# Patient Record
Sex: MALE | Race: ASIAN | NOT HISPANIC OR LATINO | URBAN - METROPOLITAN AREA
[De-identification: names, ages, dates, MRNs, and addresses within clinical notes are randomized per-mention and may not be internally consistent; named-entity substitution may affect disease eponyms.]

---

## 2020-04-25 ENCOUNTER — EMERGENCY (EMERGENCY)
Facility: HOSPITAL | Age: 65
LOS: 1 days | Discharge: HOME | End: 2020-04-25
Attending: EMERGENCY MEDICINE
Payer: MEDICARE

## 2020-04-25 VITALS
HEART RATE: 83 BPM | WEIGHT: 195.99 LBS | SYSTOLIC BLOOD PRESSURE: 129 MMHG | RESPIRATION RATE: 16 BRPM | TEMPERATURE: 98 F | OXYGEN SATURATION: 99 % | DIASTOLIC BLOOD PRESSURE: 87 MMHG

## 2020-04-25 PROCEDURE — 94640 AIRWAY INHALATION TREATMENT: CPT

## 2020-04-25 PROCEDURE — 87635 SARS-COV-2 COVID-19 AMP PRB: CPT

## 2020-04-25 PROCEDURE — 99283 EMERGENCY DEPT VISIT LOW MDM: CPT | Mod: 25

## 2020-04-25 RX ORDER — ALBUTEROL 90 UG/1
1 AEROSOL, METERED ORAL ONCE
Refills: 0 | Status: COMPLETED | OUTPATIENT
Start: 2020-04-25 | End: 2020-04-25

## 2020-04-25 RX ADMIN — ALBUTEROL 1 PUFF(S): 90 AEROSOL, METERED ORAL at 23:11

## 2020-04-25 NOTE — ED PROVIDER NOTE - OBJECTIVE STATEMENT
63 y/o male comes in with cough x two weeks   mild sob    saturation on ambulation 97%   + sick contacts with covid

## 2020-04-25 NOTE — ED ADULT NURSE NOTE - NURSING NEURO ORIENTATION
Date: 9/28/2018    Patient Name: Aram Bruno          To Whom it may concern: This letter has been written at the patient's request. The above patient was seen at the [facility] for treatment of a medical condition.     This patient
oriented to person, place and time

## 2020-04-25 NOTE — ED PROVIDER NOTE - PATIENT PORTAL LINK FT
You can access the FollowMyHealth Patient Portal offered by [facility] by registering at the following website: [email or web address]. By joining [facility]’s FollowMyHealth portal, you will also be able to view your health information using other applications (apps) compatible with our system.

## 2020-04-25 NOTE — ED ADULT NURSE NOTE - CHIEF COMPLAINT QUOTE
He has a shortness of breath for weeks and he has been coughing as well , few weeks ago he had both legs numbness

## 2020-04-26 LAB — SARS-COV-2 RNA SPEC QL NAA+PROBE: SIGNIFICANT CHANGE UP

## 2020-07-06 ENCOUNTER — EMERGENCY (EMERGENCY)
Facility: HOSPITAL | Age: 65
LOS: 1 days | Discharge: HOME | End: 2020-07-06
Attending: EMERGENCY MEDICINE
Payer: MEDICARE

## 2020-07-06 VITALS
OXYGEN SATURATION: 98 % | HEART RATE: 80 BPM | TEMPERATURE: 98 F | DIASTOLIC BLOOD PRESSURE: 87 MMHG | RESPIRATION RATE: 16 BRPM | SYSTOLIC BLOOD PRESSURE: 136 MMHG

## 2020-07-06 VITALS
OXYGEN SATURATION: 98 % | HEIGHT: 66 IN | SYSTOLIC BLOOD PRESSURE: 146 MMHG | DIASTOLIC BLOOD PRESSURE: 97 MMHG | TEMPERATURE: 99 F | HEART RATE: 76 BPM | WEIGHT: 199.96 LBS | RESPIRATION RATE: 16 BRPM

## 2020-07-06 PROBLEM — Z78.9 OTHER SPECIFIED HEALTH STATUS: Chronic | Status: ACTIVE | Noted: 2020-04-28

## 2020-07-06 LAB
ALBUMIN SERPL ELPH-MCNC: 3.6 G/DL — SIGNIFICANT CHANGE UP (ref 3.5–5)
ALP SERPL-CCNC: 51 U/L — SIGNIFICANT CHANGE UP (ref 40–120)
ALT FLD-CCNC: 74 U/L DA — HIGH (ref 10–60)
ANION GAP SERPL CALC-SCNC: 8 MMOL/L — SIGNIFICANT CHANGE UP (ref 5–17)
APTT BLD: 33.8 SEC — SIGNIFICANT CHANGE UP (ref 27.5–35.5)
AST SERPL-CCNC: 34 U/L — SIGNIFICANT CHANGE UP (ref 10–40)
BASOPHILS # BLD AUTO: 0.02 K/UL — SIGNIFICANT CHANGE UP (ref 0–0.2)
BASOPHILS NFR BLD AUTO: 0.3 % — SIGNIFICANT CHANGE UP (ref 0–2)
BILIRUB SERPL-MCNC: 0.3 MG/DL — SIGNIFICANT CHANGE UP (ref 0.2–1.2)
BUN SERPL-MCNC: 24 MG/DL — HIGH (ref 7–18)
CALCIUM SERPL-MCNC: 8.9 MG/DL — SIGNIFICANT CHANGE UP (ref 8.4–10.5)
CHLORIDE SERPL-SCNC: 105 MMOL/L — SIGNIFICANT CHANGE UP (ref 96–108)
CO2 SERPL-SCNC: 30 MMOL/L — SIGNIFICANT CHANGE UP (ref 22–31)
CREAT SERPL-MCNC: 1.19 MG/DL — SIGNIFICANT CHANGE UP (ref 0.5–1.3)
EOSINOPHIL # BLD AUTO: 0.1 K/UL — SIGNIFICANT CHANGE UP (ref 0–0.5)
EOSINOPHIL NFR BLD AUTO: 1.6 % — SIGNIFICANT CHANGE UP (ref 0–6)
GLUCOSE SERPL-MCNC: 115 MG/DL — HIGH (ref 70–99)
HCT VFR BLD CALC: 43 % — SIGNIFICANT CHANGE UP (ref 39–50)
HGB BLD-MCNC: 13.9 G/DL — SIGNIFICANT CHANGE UP (ref 13–17)
IMM GRANULOCYTES NFR BLD AUTO: 0.2 % — SIGNIFICANT CHANGE UP (ref 0–1.5)
INR BLD: 0.98 RATIO — SIGNIFICANT CHANGE UP (ref 0.88–1.16)
LYMPHOCYTES # BLD AUTO: 2.09 K/UL — SIGNIFICANT CHANGE UP (ref 1–3.3)
LYMPHOCYTES # BLD AUTO: 33.7 % — SIGNIFICANT CHANGE UP (ref 13–44)
MCHC RBC-ENTMCNC: 28.8 PG — SIGNIFICANT CHANGE UP (ref 27–34)
MCHC RBC-ENTMCNC: 32.3 GM/DL — SIGNIFICANT CHANGE UP (ref 32–36)
MCV RBC AUTO: 89 FL — SIGNIFICANT CHANGE UP (ref 80–100)
MONOCYTES # BLD AUTO: 0.53 K/UL — SIGNIFICANT CHANGE UP (ref 0–0.9)
MONOCYTES NFR BLD AUTO: 8.5 % — SIGNIFICANT CHANGE UP (ref 2–14)
NEUTROPHILS # BLD AUTO: 3.45 K/UL — SIGNIFICANT CHANGE UP (ref 1.8–7.4)
NEUTROPHILS NFR BLD AUTO: 55.7 % — SIGNIFICANT CHANGE UP (ref 43–77)
NRBC # BLD: 0 /100 WBCS — SIGNIFICANT CHANGE UP (ref 0–0)
PLATELET # BLD AUTO: 217 K/UL — SIGNIFICANT CHANGE UP (ref 150–400)
POTASSIUM SERPL-MCNC: 4.1 MMOL/L — SIGNIFICANT CHANGE UP (ref 3.5–5.3)
POTASSIUM SERPL-SCNC: 4.1 MMOL/L — SIGNIFICANT CHANGE UP (ref 3.5–5.3)
PROT SERPL-MCNC: 7.9 G/DL — SIGNIFICANT CHANGE UP (ref 6–8.3)
PROTHROM AB SERPL-ACNC: 11.5 SEC — SIGNIFICANT CHANGE UP (ref 10.6–13.6)
RBC # BLD: 4.83 M/UL — SIGNIFICANT CHANGE UP (ref 4.2–5.8)
RBC # FLD: 13.2 % — SIGNIFICANT CHANGE UP (ref 10.3–14.5)
SODIUM SERPL-SCNC: 143 MMOL/L — SIGNIFICANT CHANGE UP (ref 135–145)
TROPONIN I SERPL-MCNC: <0.015 NG/ML — SIGNIFICANT CHANGE UP (ref 0–0.04)
WBC # BLD: 6.2 K/UL — SIGNIFICANT CHANGE UP (ref 3.8–10.5)
WBC # FLD AUTO: 6.2 K/UL — SIGNIFICANT CHANGE UP (ref 3.8–10.5)

## 2020-07-06 PROCEDURE — 99284 EMERGENCY DEPT VISIT MOD MDM: CPT

## 2020-07-06 PROCEDURE — 70450 CT HEAD/BRAIN W/O DYE: CPT | Mod: 26

## 2020-07-06 PROCEDURE — 36415 COLL VENOUS BLD VENIPUNCTURE: CPT

## 2020-07-06 PROCEDURE — 70450 CT HEAD/BRAIN W/O DYE: CPT

## 2020-07-06 PROCEDURE — 85027 COMPLETE CBC AUTOMATED: CPT

## 2020-07-06 PROCEDURE — 80053 COMPREHEN METABOLIC PANEL: CPT

## 2020-07-06 PROCEDURE — 82962 GLUCOSE BLOOD TEST: CPT

## 2020-07-06 PROCEDURE — 85730 THROMBOPLASTIN TIME PARTIAL: CPT

## 2020-07-06 PROCEDURE — 99284 EMERGENCY DEPT VISIT MOD MDM: CPT | Mod: 25

## 2020-07-06 PROCEDURE — 85610 PROTHROMBIN TIME: CPT

## 2020-07-06 PROCEDURE — 93005 ELECTROCARDIOGRAM TRACING: CPT

## 2020-07-06 PROCEDURE — 84484 ASSAY OF TROPONIN QUANT: CPT

## 2020-07-06 RX ORDER — VALACYCLOVIR 500 MG/1
1 TABLET, FILM COATED ORAL
Qty: 21 | Refills: 0
Start: 2020-07-06 | End: 2020-07-12

## 2020-07-06 RX ADMIN — Medication 50 MILLIGRAM(S): at 03:07

## 2020-07-06 NOTE — ED PROVIDER NOTE - PATIENT PORTAL LINK FT
You can access the FollowMyHealth Patient Portal offered by [facility] by registering at the following website: [email or web address]. By joining [facility]’s FollowMyHealth portal, you will also be able to view your health information using other applications (apps) compatible with our system.

## 2020-07-06 NOTE — ED PROVIDER NOTE - CLINICAL SUMMARY MEDICAL DECISION MAKING FREE TEXT BOX
CT head reported: No acute intracranial CT finding. Pt's symptoms cw with Howells palsy. Rx Valsartan, Prednisone, lacrilube. Pt is well appearing, has no new complaints and able to walk with normal gait. Pt is stable for discharge and follow up with medical doctor. Pt educated on care and need for follow up. Discussed anticipatory guidance and return precautions. Questions answered. I had a detailed discussion with the patient and/or guardian regarding the historical points, exam findings, and any diagnostic results supporting the discharge diagnosis.

## 2020-07-06 NOTE — ED ADULT NURSE NOTE - OBJECTIVE STATEMENT
The patient presents with right facial droop, onset at 1700.  His wife told him that his face looked funny.  Right facial droop noted and the patient verbalized a slight numbness to his right face.  No other neuro symptoms noted.

## 2020-07-06 NOTE — ED ADULT TRIAGE NOTE - CHIEF COMPLAINT QUOTE
as per son; " My mother noticed his one side of face dropping, it was about 5:30 " as per son; " My mother noticed his one side of face dropping, it was about 5:30 "  Positive covid 2 months ago and repeat test Negative covid after in May

## 2020-07-06 NOTE — ED PROVIDER NOTE - NSFOLLOWUPCLINICS_GEN_ALL_ED_FT
[facility]  Neurology  [address] [place] [address]  Phone: [phone]  Fax: [phone]  Follow Up Time:

## 2020-07-06 NOTE — ED PROVIDER NOTE - OBJECTIVE STATEMENT
64 year old male with no PMHx presenting after his wife noted right sided facial weakness since 5:30PM yesterday. Denies any other motor weakness, slurred speech, chest pain, shortness of breath, or any other acute complaints.

## 2020-07-06 NOTE — ED ADULT NURSE NOTE - CHPI ED NUR SYMPTOMS NEG
no confusion/no blurred vision/no nausea/no vomiting/no loss of consciousness/no dizziness/no change in level of consciousness/no weakness/no fever

## 2020-07-06 NOTE — ED ADULT NURSE NOTE - CHIEF COMPLAINT QUOTE
as per son; " My mother noticed his one side of face dropping, it was about 5:30 "  Positive covid 2 months ago and repeat test Negative covid after in May

## 2021-07-15 NOTE — ED ADULT NURSE NOTE - CAS ELECT INFOMATION PROVIDED
Body Location Override (Optional - Billing Will Still Be Based On Selected Body Map Location If Applicable): right upper arm
Detail Level: Detailed
Add 40530 Cpt? (Important Note: In 2017 The Use Of 09787 Is Being Tracked By Cms To Determine Future Global Period Reimbursement For Global Periods): yes
DC instructions

## 2022-04-02 ENCOUNTER — EMERGENCY (EMERGENCY)
Facility: HOSPITAL | Age: 67
LOS: 1 days | Discharge: HOME | End: 2022-04-02
Attending: STUDENT IN AN ORGANIZED HEALTH CARE EDUCATION/TRAINING PROGRAM
Payer: MEDICARE

## 2022-04-02 VITALS
TEMPERATURE: 97 F | OXYGEN SATURATION: 98 % | RESPIRATION RATE: 18 BRPM | DIASTOLIC BLOOD PRESSURE: 92 MMHG | HEART RATE: 90 BPM | HEIGHT: 66 IN | WEIGHT: 199.96 LBS | SYSTOLIC BLOOD PRESSURE: 148 MMHG

## 2022-04-02 PROCEDURE — 99283 EMERGENCY DEPT VISIT LOW MDM: CPT | Mod: 25

## 2022-04-02 PROCEDURE — 90715 TDAP VACCINE 7 YRS/> IM: CPT

## 2022-04-02 PROCEDURE — 99284 EMERGENCY DEPT VISIT MOD MDM: CPT

## 2022-04-02 PROCEDURE — 90471 IMMUNIZATION ADMIN: CPT

## 2022-04-02 RX ORDER — TETANUS TOXOID, REDUCED DIPHTHERIA TOXOID AND ACELLULAR PERTUSSIS VACCINE, ADSORBED 5; 2.5; 8; 8; 2.5 [IU]/.5ML; [IU]/.5ML; UG/.5ML; UG/.5ML; UG/.5ML
0.5 SUSPENSION INTRAMUSCULAR ONCE
Refills: 0 | Status: COMPLETED | OUTPATIENT
Start: 2022-04-02 | End: 2022-04-02

## 2022-04-02 RX ADMIN — Medication 1 TABLET(S): at 21:19

## 2022-04-02 RX ADMIN — TETANUS TOXOID, REDUCED DIPHTHERIA TOXOID AND ACELLULAR PERTUSSIS VACCINE, ADSORBED 0.5 MILLILITER(S): 5; 2.5; 8; 8; 2.5 SUSPENSION INTRAMUSCULAR at 21:19

## 2022-04-02 NOTE — ED ADULT NURSE NOTE - BRAND OF COVID-19 VACCINATION
Left voicemail for Meño to see availability for coumadin clinic tomorrow before noon for Paula. Will follow up regarding same.   
Moderna dose 1, 2, and 3

## 2022-04-02 NOTE — ED PROVIDER NOTE - NSFOLLOWUPINSTRUCTIONS_ED_ALL_ED_FT
You were seen in the emergency department for: dog bite  Your diagnosis for this visit was:  From this ED visit you were prescribed: Augmentin     You may be contacted by the Emergency Department Referrals Coordinator to set up your follow-up appointment within 24-48 hours of your discharge, Monday to Friday. We recommend you follow up with: your primary care doctor    Please return to the Emergency Department if you experience any of the following symptoms:   - Shortness of breath or trouble breathing  - Pressure, pain or tightness in the chest  - Face drooping, arm weakness or speech difficulty  - Persistence of severe vomiting  - Head injury or loss of consciousness  - Nonstop bleeding or an open wound    (1) Follow up with your primary care physician within the next 24-48 hours as discussed. In addition, we did not find evidence of a life threatening illness on your testing here today, but listed below are the specialists that will be necessary to see as an outpatient to continue the workup.  Please call the numbers listed below or [phone] to set up the necessary appointments.  (2) Take Tylenol (up to 1000mg or 1 g)  and/or Motrin (up to 600mg) up to every 6 hours as needed for pain.   (3) If you had an IV (intravenous) line placed, it was removed. Sometimes, after IV removal, that area can be tender for a few days; if it develops redness and swelling, those could be signs of infection; in which case, return to the Emergency Department for assessment.  (4) Please continue taking all of your home medications as directed. You were seen in the emergency department for: dog bite  From this ED visit you were prescribed: Augmentin   You also received a tetanus shot.  Please return if you develop severe pain, redness, drainage, fevers/chills, or any concerning symptoms.     You may be contacted by the Emergency Department Referrals Coordinator to set up your follow-up appointment within 24-48 hours of your discharge, Monday to Friday. We recommend you follow up with: your primary care doctor    Please return to the Emergency Department if you experience any of the following symptoms:   - Shortness of breath or trouble breathing  - Pressure, pain or tightness in the chest  - Face drooping, arm weakness or speech difficulty  - Persistence of severe vomiting  - Head injury or loss of consciousness  - Nonstop bleeding or an open wound    (1) Follow up with your primary care physician within the next 24-48 hours as discussed. In addition, we did not find evidence of a life threatening illness on your testing here today, but listed below are the specialists that will be necessary to see as an outpatient to continue the workup.  Please call the numbers listed below or [phone] to set up the necessary appointments.  (2) Take Tylenol (up to 1000mg or 1 g)  and/or Motrin (up to 600mg) up to every 6 hours as needed for pain.   (3) If you had an IV (intravenous) line placed, it was removed. Sometimes, after IV removal, that area can be tender for a few days; if it develops redness and swelling, those could be signs of infection; in which case, return to the Emergency Department for assessment.  (4) Please continue taking all of your home medications as directed.

## 2022-04-02 NOTE — ED PROVIDER NOTE - CLINICAL SUMMARY MEDICAL DECISION MAKING FREE TEXT BOX
66 year old male with no past medical history presents to ED following a dog bite to the right leg. No indication for wound closure. Will update tetanus and provide Augmentin for infection prophylaxis. No indication for rabies prophylaxis given low likelihood of rabies. Patient has contact info for owner of the dog and will follow up regarding vaccination status.

## 2022-04-02 NOTE — ED ADULT NURSE NOTE - CHIEF COMPLAINT QUOTE
Pt stated he was walking his dog almost 2 hours ago and his dog got into a fight he tried to pull his dog out and the other dog bite him on his right lower leg.

## 2022-04-02 NOTE — ED PROVIDER NOTE - OBJECTIVE STATEMENT
66 year old male with no past medical history presents to ED following a dog bite to the right leg. He reports that he was walking his dog when another off-leash dog attempted to his attack his own dog. Patient reportedly picked up his own dog and was bitten in the leg by the other dog. He is unsure whether that dog had its vaccines. He denies current symptoms, and states that he is just concerned and wants to get his leg checked out. He denies numbness, tingling, weakness, or other concerning symptoms. Last Tdap is unknown. NKDA.

## 2022-04-02 NOTE — ED ADULT NURSE NOTE - OBJECTIVE STATEMENT
AOX4 +ambulatory patient reports he was walking his dog when an off leash dog attacked his dog. Patient tried to  his dog but the off leash dog bit his leg. Patient sustained puncture wound no r leg. No fevers or chills

## 2022-04-29 NOTE — ED ADULT TRIAGE NOTE - CHIEF COMPLAINT QUOTE
Outpatient follow-up with PCP in regards to this matter Pt stated he was walking his dog almost 2 hours ago and his dog got into a fight he tried to pull his dog out and the other dog bite him on his right lower leg. Erivedge Counseling- I discussed with the patient the risks of Erivedge including but not limited to nausea, vomiting, diarrhea, constipation, weight loss, changes in the sense of taste, decreased appetite, muscle spasms, and hair loss.  The patient verbalized understanding of the proper use and possible adverse effects of Erivedge.  All of the patient's questions and concerns were addressed.

## 2022-05-03 NOTE — ED ADULT NURSE NOTE - SKIN CAPILLARY REFILL
[No studies available for review at this time.] : No studies available for review at this time. 2 seconds or less

## 2024-01-29 NOTE — ED ADULT NURSE NOTE - NSFALLRSKASSESSTYPE_ED_ALL_ED
Runny nose. \"Water on my lungs, Im spitting up blood\". Feels like fluids are going down into lungs when drinking.   Initial (On Arrival)

## 2025-04-07 ENCOUNTER — INPATIENT (INPATIENT)
Facility: HOSPITAL | Age: 70
LOS: 5 days | Discharge: HOME | DRG: 392 | End: 2025-04-13
Attending: STUDENT IN AN ORGANIZED HEALTH CARE EDUCATION/TRAINING PROGRAM | Admitting: STUDENT IN AN ORGANIZED HEALTH CARE EDUCATION/TRAINING PROGRAM
Payer: MEDICARE

## 2025-04-07 VITALS
SYSTOLIC BLOOD PRESSURE: 109 MMHG | OXYGEN SATURATION: 100 % | WEIGHT: 203.93 LBS | DIASTOLIC BLOOD PRESSURE: 79 MMHG | RESPIRATION RATE: 20 BRPM | TEMPERATURE: 96 F | HEART RATE: 111 BPM

## 2025-04-07 DIAGNOSIS — R19.5 OTHER FECAL ABNORMALITIES: ICD-10-CM

## 2025-04-07 LAB
ALBUMIN SERPL ELPH-MCNC: 3.5 G/DL — SIGNIFICANT CHANGE UP (ref 3.5–5)
ALP SERPL-CCNC: 33 U/L — LOW (ref 40–120)
ALT FLD-CCNC: 32 U/L DA — SIGNIFICANT CHANGE UP (ref 10–60)
ANION GAP SERPL CALC-SCNC: 5 MMOL/L — SIGNIFICANT CHANGE UP (ref 5–17)
ANION GAP SERPL CALC-SCNC: 7 MMOL/L — SIGNIFICANT CHANGE UP (ref 5–17)
AST SERPL-CCNC: 16 U/L — SIGNIFICANT CHANGE UP (ref 10–40)
BASOPHILS # BLD AUTO: 0.03 K/UL — SIGNIFICANT CHANGE UP (ref 0–0.2)
BASOPHILS NFR BLD AUTO: 0.2 % — SIGNIFICANT CHANGE UP (ref 0–2)
BILIRUB SERPL-MCNC: 0.5 MG/DL — SIGNIFICANT CHANGE UP (ref 0.2–1.2)
BLD GP AB SCN SERPL QL: SIGNIFICANT CHANGE UP
BUN SERPL-MCNC: 44 MG/DL — HIGH (ref 7–18)
BUN SERPL-MCNC: 45 MG/DL — HIGH (ref 7–18)
CALCIUM SERPL-MCNC: 8.4 MG/DL — SIGNIFICANT CHANGE UP (ref 8.4–10.5)
CALCIUM SERPL-MCNC: 9.2 MG/DL — SIGNIFICANT CHANGE UP (ref 8.4–10.5)
CHLORIDE SERPL-SCNC: 105 MMOL/L — SIGNIFICANT CHANGE UP (ref 96–108)
CHLORIDE SERPL-SCNC: 108 MMOL/L — SIGNIFICANT CHANGE UP (ref 96–108)
CO2 SERPL-SCNC: 24 MMOL/L — SIGNIFICANT CHANGE UP (ref 22–31)
CO2 SERPL-SCNC: 26 MMOL/L — SIGNIFICANT CHANGE UP (ref 22–31)
CREAT SERPL-MCNC: 0.88 MG/DL — SIGNIFICANT CHANGE UP (ref 0.5–1.3)
CREAT SERPL-MCNC: 1.04 MG/DL — SIGNIFICANT CHANGE UP (ref 0.5–1.3)
EGFR: 77 ML/MIN/1.73M2 — SIGNIFICANT CHANGE UP
EGFR: 77 ML/MIN/1.73M2 — SIGNIFICANT CHANGE UP
EGFR: 92 ML/MIN/1.73M2 — SIGNIFICANT CHANGE UP
EGFR: 92 ML/MIN/1.73M2 — SIGNIFICANT CHANGE UP
EOSINOPHIL # BLD AUTO: 0.02 K/UL — SIGNIFICANT CHANGE UP (ref 0–0.5)
EOSINOPHIL NFR BLD AUTO: 0.1 % — SIGNIFICANT CHANGE UP (ref 0–6)
GLUCOSE SERPL-MCNC: 182 MG/DL — HIGH (ref 70–99)
GLUCOSE SERPL-MCNC: 183 MG/DL — HIGH (ref 70–99)
HCT VFR BLD CALC: 26.6 % — LOW (ref 39–50)
HCT VFR BLD CALC: 31.4 % — LOW (ref 39–50)
HGB BLD-MCNC: 10.4 G/DL — LOW (ref 13–17)
HGB BLD-MCNC: 8.9 G/DL — LOW (ref 13–17)
IMM GRANULOCYTES NFR BLD AUTO: 0.5 % — SIGNIFICANT CHANGE UP (ref 0–0.9)
LACTATE SERPL-SCNC: 3.6 MMOL/L — HIGH (ref 0.7–2)
LIDOCAIN IGE QN: 47 U/L — SIGNIFICANT CHANGE UP (ref 13–75)
LYMPHOCYTES # BLD AUTO: 15.8 % — SIGNIFICANT CHANGE UP (ref 13–44)
LYMPHOCYTES # BLD AUTO: 2.22 K/UL — SIGNIFICANT CHANGE UP (ref 1–3.3)
MCHC RBC-ENTMCNC: 29.6 PG — SIGNIFICANT CHANGE UP (ref 27–34)
MCHC RBC-ENTMCNC: 29.7 PG — SIGNIFICANT CHANGE UP (ref 27–34)
MCHC RBC-ENTMCNC: 33.1 G/DL — SIGNIFICANT CHANGE UP (ref 32–36)
MCHC RBC-ENTMCNC: 33.5 G/DL — SIGNIFICANT CHANGE UP (ref 32–36)
MCV RBC AUTO: 88.4 FL — SIGNIFICANT CHANGE UP (ref 80–100)
MCV RBC AUTO: 89.7 FL — SIGNIFICANT CHANGE UP (ref 80–100)
MONOCYTES # BLD AUTO: 0.38 K/UL — SIGNIFICANT CHANGE UP (ref 0–0.9)
MONOCYTES NFR BLD AUTO: 2.7 % — SIGNIFICANT CHANGE UP (ref 2–14)
NEUTROPHILS # BLD AUTO: 11.36 K/UL — HIGH (ref 1.8–7.4)
NEUTROPHILS NFR BLD AUTO: 80.7 % — HIGH (ref 43–77)
NRBC BLD AUTO-RTO: 0 /100 WBCS — SIGNIFICANT CHANGE UP (ref 0–0)
NRBC BLD AUTO-RTO: 0 /100 WBCS — SIGNIFICANT CHANGE UP (ref 0–0)
PLATELET # BLD AUTO: 226 K/UL — SIGNIFICANT CHANGE UP (ref 150–400)
PLATELET # BLD AUTO: 270 K/UL — SIGNIFICANT CHANGE UP (ref 150–400)
POTASSIUM SERPL-MCNC: 4.3 MMOL/L — SIGNIFICANT CHANGE UP (ref 3.5–5.3)
POTASSIUM SERPL-MCNC: 4.5 MMOL/L — SIGNIFICANT CHANGE UP (ref 3.5–5.3)
POTASSIUM SERPL-SCNC: 4.3 MMOL/L — SIGNIFICANT CHANGE UP (ref 3.5–5.3)
POTASSIUM SERPL-SCNC: 4.5 MMOL/L — SIGNIFICANT CHANGE UP (ref 3.5–5.3)
PROT SERPL-MCNC: 6.8 G/DL — SIGNIFICANT CHANGE UP (ref 6–8.3)
RBC # BLD: 3.01 M/UL — LOW (ref 4.2–5.8)
RBC # BLD: 3.5 M/UL — LOW (ref 4.2–5.8)
RBC # FLD: 12.7 % — SIGNIFICANT CHANGE UP (ref 10.3–14.5)
RBC # FLD: 12.8 % — SIGNIFICANT CHANGE UP (ref 10.3–14.5)
SODIUM SERPL-SCNC: 137 MMOL/L — SIGNIFICANT CHANGE UP (ref 135–145)
SODIUM SERPL-SCNC: 138 MMOL/L — SIGNIFICANT CHANGE UP (ref 135–145)
TROPONIN I, HIGH SENSITIVITY RESULT: 52.9 NG/L — SIGNIFICANT CHANGE UP
WBC # BLD: 10.91 K/UL — HIGH (ref 3.8–10.5)
WBC # BLD: 14.08 K/UL — HIGH (ref 3.8–10.5)
WBC # FLD AUTO: 10.91 K/UL — HIGH (ref 3.8–10.5)
WBC # FLD AUTO: 14.08 K/UL — HIGH (ref 3.8–10.5)

## 2025-04-07 PROCEDURE — 74178 CT ABD&PLV WO CNTR FLWD CNTR: CPT | Mod: 26

## 2025-04-07 PROCEDURE — 99285 EMERGENCY DEPT VISIT HI MDM: CPT

## 2025-04-07 PROCEDURE — 99223 1ST HOSP IP/OBS HIGH 75: CPT

## 2025-04-07 RX ORDER — ONDANSETRON HCL/PF 4 MG/2 ML
4 VIAL (ML) INJECTION EVERY 8 HOURS
Refills: 0 | Status: DISCONTINUED | OUTPATIENT
Start: 2025-04-07 | End: 2025-04-13

## 2025-04-07 RX ORDER — ACETAMINOPHEN 500 MG/5ML
650 LIQUID (ML) ORAL EVERY 6 HOURS
Refills: 0 | Status: DISCONTINUED | OUTPATIENT
Start: 2025-04-07 | End: 2025-04-13

## 2025-04-07 RX ORDER — ACETAMINOPHEN 500 MG/5ML
1000 LIQUID (ML) ORAL ONCE
Refills: 0 | Status: COMPLETED | OUTPATIENT
Start: 2025-04-07 | End: 2025-04-07

## 2025-04-07 RX ORDER — ONDANSETRON HCL/PF 4 MG/2 ML
4 VIAL (ML) INJECTION ONCE
Refills: 0 | Status: COMPLETED | OUTPATIENT
Start: 2025-04-07 | End: 2025-04-07

## 2025-04-07 RX ORDER — MAGNESIUM, ALUMINUM HYDROXIDE 200-200 MG
30 TABLET,CHEWABLE ORAL ONCE
Refills: 0 | Status: COMPLETED | OUTPATIENT
Start: 2025-04-07 | End: 2025-04-07

## 2025-04-07 RX ADMIN — Medication 4 MILLIGRAM(S): at 17:03

## 2025-04-07 RX ADMIN — Medication 400 MILLIGRAM(S): at 20:21

## 2025-04-07 RX ADMIN — Medication 1000 MILLILITER(S): at 17:11

## 2025-04-07 RX ADMIN — Medication 1000 MILLILITER(S): at 20:21

## 2025-04-07 RX ADMIN — Medication 30 MILLILITER(S): at 20:21

## 2025-04-07 RX ADMIN — Medication 80 MILLIGRAM(S): at 17:08

## 2025-04-07 NOTE — ED ADULT NURSE NOTE - CHIEF COMPLAINT QUOTE
BIB son c/o fatigue and black stools x 3 days. Near syncopal episode in triage with episode of black vomitus. Pt urinated on himself x 2 today per son. No blood thinner use. MD Carter aware and present at bedside.

## 2025-04-07 NOTE — ED PROVIDER NOTE - PROGRESS NOTE DETAILS
Patient reassessed overall feels better.  Discussed need for admission further evaluate for GI bleed.  Answered questions.

## 2025-04-07 NOTE — ED ADULT NURSE NOTE - OBJECTIVE STATEMENT
BIB son c/o fatigue and black stools x 3 days. Near syncopal episode in triage with episode of black vomitus. Pt urinated on himself x 2 today per son. No blood thinner use. MD Carter aware and present at bedside. Patient is placed on cardiac monitor and Sinus tach at 110 Hr noted. Patient denies chest pain, no SOB.  Fall bundle activated. All fall measures in place. Patient and family educated on fall prevention and possible risks of injuries. Patient and family verbalized understanding, agreed to call for assistance if needed and agrees to stay in bed for safety.

## 2025-04-07 NOTE — H&P ADULT - NSHPPHYSICALEXAM_GEN_ALL_CORE
T(F): 98.7 (07 Apr 2025 20:22), Max: 98.7 (07 Apr 2025 20:22)  HR: 108 (07 Apr 2025 21:56)  BP: 103/64 (07 Apr 2025 21:56)  RR: 20 (07 Apr 2025 20:22)  SpO2: 97% (07 Apr 2025 20:22) (97% - 100%)  temp max in last 48H T(F): , Max: 98.7 (04-07-25 @ 20:22)        GENERAL: NAD, lying in bed comfortably   HEAD:  Atraumatic, Normocephalic  EYES: clear conjunctiva and  sclera   ENT: Moist mucous membranes  NECK: Supple  LUNG: Clear to auscultation bilaterally. No rales, wheezing,   HEART: Regular rate and rhythm; No murmurs,  CHEST WALL: non tenderness    ABDOMEN: + LUQ tenderness, Bowel sounds present; Soft, , Nondistended,   EXTREMITIES:  2+ Peripheral Pulses, . No clubbing, cyanosis, or edema  RECTAL: + black stool noted on his underwear  NERVOUS SYSTEM:  AAOX3, speech clear. No deficits   SKIN: No rashes or lesions

## 2025-04-07 NOTE — H&P ADULT - PROBLEM SELECTOR PLAN 1
p/w dark stool, dizziness, fatigue. denies NSAID use   v/s: 103/64, 120  PE: Dark stool noted   Hgb 10.4  on admission  CT abdomen & pelvis: No CT findings to suggest active GI bleeding. No bowel obstruction or inflammation.  Hgb 10.4 > 8.9   - Started IV Protonix 40mg BID   - Diet: Clear liquid   - CBC Q8hrs   - Transfuse if Hgb < 7  - Consult GI in the AM p/w dark stool, dizziness, fatigue. denies NSAID use   v/s: 103/64, 120  PE: Dark stool noted   Hgb 10.4  on admission  CT abdomen & pelvis: No CT findings to suggest active GI bleeding. No bowel obstruction or inflammation.  Hgb 10.4 > 8.9   s/p Zofran, tylenol, maloox, Pantoprazole 80mg   - Started IV Protonix 40mg BID   - Diet: Clear liquid   - CBC Q8hrs   - Transfuse if Hgb < 7  - Consult GI in the AM p/w dark stool, dizziness, fatigue. denies NSAID use   v/s: 103/64, 120  PE: Dark stool noted   Hgb 10.4  on admission  CT abdomen & pelvis: No CT findings to suggest active GI bleeding. No bowel obstruction or inflammation.  Hgb 10.4 > 8.9   s/p Zofran, tylenol, maloox, Pantoprazole 80mg   - Started IV Protonix 40mg BID   - Diet: NPO  - CBC Q4hrs   - Transfuse if Hgb < 7  - GI consulted. SHEA Jama

## 2025-04-07 NOTE — H&P ADULT - NSHPREVIEWOFSYSTEMS_GEN_ALL_CORE
REVIEW OF SYSTEMS:  CONSTITUTIONAL: No fevers or chills  EYES: No conjunctiva redness, No eye discharge  ENT: No nasal congestion, No sore throat, No ear pain,   NECK: No pain or stiffness  RESPIRATORY: No cough, SOB,  wheezing, hemoptysis  CARDIOVASCULAR: No chest pain or  palpitations  GASTROINTESTINAL: + abdominal pain. nausea, + black stool, No vomiting, diarrhea or constipation.  GENITOURINARY: No dysuria, frequency or hematuria  NEUROLOGICAL: + Dizziness, No headache,  SKIN: No itching, rashes,   All other review of systems is negative unless indicated above.

## 2025-04-07 NOTE — H&P ADULT - ATTENDING COMMENTS
Vital Signs Last 24 Hrs  T(C): 37.1 (07 Apr 2025 20:22), Max: 37.1 (07 Apr 2025 20:22)  T(F): 98.7 (07 Apr 2025 20:22), Max: 98.7 (07 Apr 2025 20:22)  HR: 108 (07 Apr 2025 21:56) (108 - 120)  BP: 103/64 (07 Apr 2025 21:56) (103/64 - 109/79)  RR: 20 (07 Apr 2025 20:22) (20 - 20)  SpO2: 97% (07 Apr 2025 20:22) (97% - 100%)  Parameters below as of 07 Apr 2025 20:22  Patient On (Oxygen Delivery Method): room air    Labs  wbc 14 k - 11k  hgb10.4 --> 8.9    BUN 45-->44  Cr 1.04 --> 0.88    Trop 168  Lactate 3.6      CT abdomen/pelvis  1.  No CT findings to suggest active GI bleeding. No bowel obstruction or   inflammation.  2.  Incidental 1.6 cm circumscribed enhancing mass in the pancreatic   head, suspicious for a neuroendocrine tumor. Additional indeterminate 8   mm enhancing focus in the left hepatic lobe; metastasis is not excluded.     Impression   69 year old man with hx of HTN with no prior visit to this facility who presents with weakness, dizziness preceded by  few days of dark stools, nausea and dark colored emesis as well but no abdominal pain.  Concern for upper GI bleed especially with persistent azotemia    Incidental finding of an enhancing pancreatic head mass concerning for a neuroendocrine tumor with another smaller enhancing left hepatic lobe focus  --> r/o functioning gastrin- producing PNET    Plan   Admit to Medicine   No evidence of active bleeding   Serial H/H  PPI 40mg q 12hourly  IVF hydration and repeat lactate  Serial troponin   Check serum gastrin  GI consult   EGD and EUS of pancreatic tumor  Oncology consult

## 2025-04-07 NOTE — H&P ADULT - HISTORY OF PRESENT ILLNESS
68 yo M, from home. Ex smoker ( Quit 10 years ago, 30pk years) with PMH of HLD, DM present with 3 days history of well formed  dark stool, generalized weakness, dizziness ( room spinning sensation) and nausea that worsened today. He  recently returned from a 5-week trip froom [place] where he reports eating variety of food. He endorses 10 years history of dull intermittent LUQ abdominal pain. He does not know what triggers the pain but it tends to resolve on its own. Reports he never disclosed the pain to any doctor in the  past because the pain resolves on its own.  He denies any fever, chest pain, sob, fever, cough or palpitations. vomiting, abdominal surgeries or required blood transfusion,   Never had any issues with gastritis or stomach ulcers., weight loss

## 2025-04-07 NOTE — ED ADULT NURSE NOTE - NSFALLHARMRISKINTERV_ED_ALL_ED
Assistance OOB with selected safe patient handling equipment if applicable/Communicate risk of Fall with Harm to all staff, patient, and family/Orthostatic vital signs/Provide visual cue: red socks, yellow wristband, yellow gown, etc/Reinforce activity limits and safety measures with patient and family/Bed in lowest position, wheels locked, appropriate side rails in place/Call bell, personal items and telephone in reach/Instruct patient to call for assistance before getting out of bed/chair/stretcher/Non-slip footwear applied when patient is off stretcher/[place] to call system/Physically safe environment - no spills, clutter or unnecessary equipment/Purposeful Proactive Rounding/Room/bathroom lighting operational, light cord in reach

## 2025-04-07 NOTE — H&P ADULT - ASSESSMENT
68 yo M, from home. Ex smoker ( Quit 10 years ago, 30pk years) with PMH of HLD, DM present with 3 days history of well formed  dark stool, generalized weakness, dizziness ( room spinning sensation) and nausea that worsened today. Admitted to medicine for dark  stool

## 2025-04-07 NOTE — ED PROVIDER NOTE - CLINICAL SUMMARY MEDICAL DECISION MAKING FREE TEXT BOX
69-year-old male with history of hypertension presenting with generalized weakness with history of recent dark tarry stools.  Patient with nausea and vomiting in triage dark-colored.  Concern for GI bleed, gastroenteritis, metabolic derangement less likely ACS among other causes.  Plan to perform labs, type and screen, EKG, chest x-ray IV hydration and reassess.

## 2025-04-07 NOTE — ED PROVIDER NOTE - OBJECTIVE STATEMENT
69-year-old male history of hypertension presenting with generalized weakness today.  Also relates lightheadedness/dizziness.  Relates 3 days of dark tarry stools.  Today started feeling nauseated.  Last had crackers and coffee in the morning.  Recently returned from a 5-week trip to [place] where he states he "ate a lot".  Accompanied by son Cedrick who relates that patient never had anemia.  Never had any abdominal surgeries or required blood transfusion before.  Never had any issues with gastritis or stomach ulcers. 69-year-old male history of hypertension presenting with generalized weakness today.  Also relates lightheadedness/dizziness.  Relates 3 days of dark but formed stools.  Today started feeling nauseated.  Last had crackers and coffee in the morning.  Recently returned from a 5-week trip to [place] where he states he "ate a lot".  Accompanied by son Cedrick who relates that patient never had anemia.  No cp, sob, fever, cough or palpitations. Not on blood thinners. Never had any abdominal surgeries or required blood transfusion before.  Never had any issues with gastritis or stomach ulcers.

## 2025-04-07 NOTE — ED ADULT TRIAGE NOTE - CHIEF COMPLAINT QUOTE
BIB son c/o fatigue and black stools x 3 days. Near syncopal episode in triage with episode of bloody vomitus. Pt urinated on himself x 2. No blood thinner use. MD Carter aware. BIB son c/o fatigue and black stools x 3 days. Near syncopal episode in triage with episode of black vomitus. Pt urinated on himself x 2 today per son. No blood thinner use. MD Carter aware and present at bedside.

## 2025-04-07 NOTE — H&P ADULT - PROBLEM SELECTOR PLAN 5
DVT ppx: SCD  GI ppx: PPI BID Hx of DM on Metformin 500mg QD  - Hold home med   - Start Insulin sliding scale   - Finger sticks  - Diabetic diet  - f/u A1c

## 2025-04-07 NOTE — H&P ADULT - PROBLEM SELECTOR PLAN 2
CT Abdomen & Pelvis: Incidental 1.6 cm circumscribed enhancing mass in the pancreatic head, suspicious for a neuroendocrine tumor. Additional indeterminate 8 mm enhancing focus in the left hepatic lobe; metastasis is not excluded.   - Consult GI in the AM  - f/u Gastrin lactate 3.5 on admission  s/p 2 L NS   - trend lactate   - c/w  IVF lactate 3.5 on admission  s/p 2 L NS   Lactate 3.5 > 2.1  - c/w  IVF

## 2025-04-07 NOTE — H&P ADULT - PROBLEM SELECTOR PLAN 4
Hx of DM on Metformin 500mg QD  - Hold home med   - Start Insulin sliding scale   - Finger sticks  - Diabetic diet  - f/u A1c Hx of HLD on Atorvastatin 20mg   - c/w home med

## 2025-04-08 DIAGNOSIS — E78.5 HYPERLIPIDEMIA, UNSPECIFIED: ICD-10-CM

## 2025-04-08 DIAGNOSIS — E11.9 TYPE 2 DIABETES MELLITUS WITHOUT COMPLICATIONS: ICD-10-CM

## 2025-04-08 DIAGNOSIS — Z29.9 ENCOUNTER FOR PROPHYLACTIC MEASURES, UNSPECIFIED: ICD-10-CM

## 2025-04-08 DIAGNOSIS — R79.89 OTHER SPECIFIED ABNORMAL FINDINGS OF BLOOD CHEMISTRY: ICD-10-CM

## 2025-04-08 DIAGNOSIS — D49.0 NEOPLASM OF UNSPECIFIED BEHAVIOR OF DIGESTIVE SYSTEM: ICD-10-CM

## 2025-04-08 DIAGNOSIS — R19.5 OTHER FECAL ABNORMALITIES: ICD-10-CM

## 2025-04-08 LAB
ABO RH CONFIRMATION: SIGNIFICANT CHANGE UP
ALBUMIN SERPL ELPH-MCNC: 3.1 G/DL — LOW (ref 3.5–5)
ALP SERPL-CCNC: 29 U/L — LOW (ref 40–120)
ALT FLD-CCNC: 28 U/L DA — SIGNIFICANT CHANGE UP (ref 10–60)
ANION GAP SERPL CALC-SCNC: 7 MMOL/L — SIGNIFICANT CHANGE UP (ref 5–17)
ANISOCYTOSIS BLD QL: SLIGHT — SIGNIFICANT CHANGE UP
APPEARANCE UR: CLEAR — SIGNIFICANT CHANGE UP
AST SERPL-CCNC: 10 U/L — SIGNIFICANT CHANGE UP (ref 10–40)
BASOPHILS # BLD AUTO: 0.02 K/UL — SIGNIFICANT CHANGE UP (ref 0–0.2)
BASOPHILS NFR BLD AUTO: 0.2 % — SIGNIFICANT CHANGE UP (ref 0–2)
BILIRUB SERPL-MCNC: 0.4 MG/DL — SIGNIFICANT CHANGE UP (ref 0.2–1.2)
BILIRUB UR-MCNC: NEGATIVE — SIGNIFICANT CHANGE UP
BUN SERPL-MCNC: 42 MG/DL — HIGH (ref 7–18)
CALCIUM SERPL-MCNC: 8.3 MG/DL — LOW (ref 8.4–10.5)
CHLORIDE SERPL-SCNC: 108 MMOL/L — SIGNIFICANT CHANGE UP (ref 96–108)
CO2 SERPL-SCNC: 23 MMOL/L — SIGNIFICANT CHANGE UP (ref 22–31)
COLOR SPEC: YELLOW — SIGNIFICANT CHANGE UP
CREAT SERPL-MCNC: 1.08 MG/DL — SIGNIFICANT CHANGE UP (ref 0.5–1.3)
DIFF PNL FLD: NEGATIVE — SIGNIFICANT CHANGE UP
EGFR: 73 ML/MIN/1.73M2 — SIGNIFICANT CHANGE UP
EGFR: 73 ML/MIN/1.73M2 — SIGNIFICANT CHANGE UP
EOSINOPHIL # BLD AUTO: 0 K/UL — SIGNIFICANT CHANGE UP (ref 0–0.5)
EOSINOPHIL NFR BLD AUTO: 0 % — SIGNIFICANT CHANGE UP (ref 0–6)
GLUCOSE BLDC GLUCOMTR-MCNC: 239 MG/DL — HIGH (ref 70–99)
GLUCOSE SERPL-MCNC: 224 MG/DL — HIGH (ref 70–99)
GLUCOSE UR QL: 250 MG/DL
HCT VFR BLD CALC: 19.8 % — CRITICAL LOW (ref 39–50)
HCT VFR BLD CALC: 24.1 % — LOW (ref 39–50)
HCT VFR BLD CALC: 24.7 % — LOW (ref 39–50)
HGB BLD-MCNC: 6.4 G/DL — CRITICAL LOW (ref 13–17)
HGB BLD-MCNC: 8 G/DL — LOW (ref 13–17)
HGB BLD-MCNC: 8 G/DL — LOW (ref 13–17)
IMM GRANULOCYTES NFR BLD AUTO: 0.5 % — SIGNIFICANT CHANGE UP (ref 0–0.9)
KETONES UR-MCNC: NEGATIVE MG/DL — SIGNIFICANT CHANGE UP
LACTATE SERPL-SCNC: 2.1 MMOL/L — HIGH (ref 0.7–2)
LACTATE SERPL-SCNC: 3.4 MMOL/L — HIGH (ref 0.7–2)
LEUKOCYTE ESTERASE UR-ACNC: NEGATIVE — SIGNIFICANT CHANGE UP
LG PLATELETS BLD QL AUTO: SLIGHT — SIGNIFICANT CHANGE UP
LYMPHOCYTES # BLD AUTO: 17.6 % — SIGNIFICANT CHANGE UP (ref 13–44)
LYMPHOCYTES # BLD AUTO: 2.05 K/UL — SIGNIFICANT CHANGE UP (ref 1–3.3)
MAGNESIUM SERPL-MCNC: 2.4 MG/DL — SIGNIFICANT CHANGE UP (ref 1.6–2.6)
MANUAL SMEAR VERIFICATION: SIGNIFICANT CHANGE UP
MCHC RBC-ENTMCNC: 29.3 PG — SIGNIFICANT CHANGE UP (ref 27–34)
MCHC RBC-ENTMCNC: 29.5 PG — SIGNIFICANT CHANGE UP (ref 27–34)
MCHC RBC-ENTMCNC: 30 PG — SIGNIFICANT CHANGE UP (ref 27–34)
MCHC RBC-ENTMCNC: 32.3 G/DL — SIGNIFICANT CHANGE UP (ref 32–36)
MCHC RBC-ENTMCNC: 32.4 G/DL — SIGNIFICANT CHANGE UP (ref 32–36)
MCHC RBC-ENTMCNC: 33.2 G/DL — SIGNIFICANT CHANGE UP (ref 32–36)
MCV RBC AUTO: 90.3 FL — SIGNIFICANT CHANGE UP (ref 80–100)
MCV RBC AUTO: 90.5 FL — SIGNIFICANT CHANGE UP (ref 80–100)
MCV RBC AUTO: 91.2 FL — SIGNIFICANT CHANGE UP (ref 80–100)
MICROCYTES BLD QL: SLIGHT — SIGNIFICANT CHANGE UP
MONOCYTES # BLD AUTO: 0.59 K/UL — SIGNIFICANT CHANGE UP (ref 0–0.9)
MONOCYTES NFR BLD AUTO: 5.1 % — SIGNIFICANT CHANGE UP (ref 2–14)
NEUTROPHILS # BLD AUTO: 8.93 K/UL — HIGH (ref 1.8–7.4)
NEUTROPHILS NFR BLD AUTO: 76.6 % — SIGNIFICANT CHANGE UP (ref 43–77)
NITRITE UR-MCNC: NEGATIVE — SIGNIFICANT CHANGE UP
NRBC BLD AUTO-RTO: 0 /100 WBCS — SIGNIFICANT CHANGE UP (ref 0–0)
PH UR: 5 — SIGNIFICANT CHANGE UP (ref 5–8)
PHOSPHATE SERPL-MCNC: 2.5 MG/DL — SIGNIFICANT CHANGE UP (ref 2.5–4.5)
PLAT MORPH BLD: NORMAL — SIGNIFICANT CHANGE UP
PLATELET # BLD AUTO: 186 K/UL — SIGNIFICANT CHANGE UP (ref 150–400)
PLATELET # BLD AUTO: 200 K/UL — SIGNIFICANT CHANGE UP (ref 150–400)
PLATELET # BLD AUTO: 226 K/UL — SIGNIFICANT CHANGE UP (ref 150–400)
POIKILOCYTOSIS BLD QL AUTO: SLIGHT — SIGNIFICANT CHANGE UP
POTASSIUM SERPL-MCNC: 4.3 MMOL/L — SIGNIFICANT CHANGE UP (ref 3.5–5.3)
POTASSIUM SERPL-SCNC: 4.3 MMOL/L — SIGNIFICANT CHANGE UP (ref 3.5–5.3)
PROT SERPL-MCNC: 6 G/DL — SIGNIFICANT CHANGE UP (ref 6–8.3)
PROT UR-MCNC: NEGATIVE MG/DL — SIGNIFICANT CHANGE UP
RBC # BLD: 2.17 M/UL — LOW (ref 4.2–5.8)
RBC # BLD: 2.67 M/UL — LOW (ref 4.2–5.8)
RBC # BLD: 2.73 M/UL — LOW (ref 4.2–5.8)
RBC # FLD: 13.1 % — SIGNIFICANT CHANGE UP (ref 10.3–14.5)
RBC # FLD: 13.2 % — SIGNIFICANT CHANGE UP (ref 10.3–14.5)
RBC # FLD: 13.4 % — SIGNIFICANT CHANGE UP (ref 10.3–14.5)
RBC BLD AUTO: ABNORMAL
SODIUM SERPL-SCNC: 138 MMOL/L — SIGNIFICANT CHANGE UP (ref 135–145)
SP GR SPEC: 1.02 — SIGNIFICANT CHANGE UP (ref 1–1.03)
SPHEROCYTES BLD QL SMEAR: SLIGHT — SIGNIFICANT CHANGE UP
UROBILINOGEN FLD QL: 0.2 MG/DL — SIGNIFICANT CHANGE UP (ref 0.2–1)
WBC # BLD: 10.21 K/UL — SIGNIFICANT CHANGE UP (ref 3.8–10.5)
WBC # BLD: 10.52 K/UL — HIGH (ref 3.8–10.5)
WBC # BLD: 11.65 K/UL — HIGH (ref 3.8–10.5)
WBC # FLD AUTO: 10.21 K/UL — SIGNIFICANT CHANGE UP (ref 3.8–10.5)
WBC # FLD AUTO: 10.52 K/UL — HIGH (ref 3.8–10.5)
WBC # FLD AUTO: 11.65 K/UL — HIGH (ref 3.8–10.5)

## 2025-04-08 PROCEDURE — 99223 1ST HOSP IP/OBS HIGH 75: CPT | Mod: FS

## 2025-04-08 PROCEDURE — 99233 SBSQ HOSP IP/OBS HIGH 50: CPT | Mod: FS

## 2025-04-08 RX ORDER — METFORMIN HYDROCHLORIDE 850 MG/1
1 TABLET ORAL
Refills: 0 | DISCHARGE

## 2025-04-08 RX ORDER — ATORVASTATIN CALCIUM 80 MG/1
20 TABLET, FILM COATED ORAL AT BEDTIME
Refills: 0 | Status: DISCONTINUED | OUTPATIENT
Start: 2025-04-08 | End: 2025-04-13

## 2025-04-08 RX ORDER — ATORVASTATIN CALCIUM 80 MG/1
1 TABLET, FILM COATED ORAL
Refills: 0 | DISCHARGE

## 2025-04-08 RX ORDER — SODIUM CHLORIDE 9 G/1000ML
1000 INJECTION, SOLUTION INTRAVENOUS
Refills: 0 | Status: DISCONTINUED | OUTPATIENT
Start: 2025-04-08 | End: 2025-04-10

## 2025-04-08 RX ADMIN — Medication 1000 MILLILITER(S): at 19:10

## 2025-04-08 RX ADMIN — Medication 40 MILLIGRAM(S): at 06:47

## 2025-04-08 RX ADMIN — SODIUM CHLORIDE 80 MILLILITER(S): 9 INJECTION, SOLUTION INTRAVENOUS at 06:47

## 2025-04-08 RX ADMIN — ATORVASTATIN CALCIUM 20 MILLIGRAM(S): 80 TABLET, FILM COATED ORAL at 21:54

## 2025-04-08 RX ADMIN — Medication 40 MILLIGRAM(S): at 17:16

## 2025-04-08 RX ADMIN — SODIUM CHLORIDE 80 MILLILITER(S): 9 INJECTION, SOLUTION INTRAVENOUS at 17:41

## 2025-04-08 RX ADMIN — SODIUM CHLORIDE 80 MILLILITER(S): 9 INJECTION, SOLUTION INTRAVENOUS at 21:54

## 2025-04-08 NOTE — PATIENT PROFILE ADULT - FUNCTIONAL ASSESSMENT - BASIC MOBILITY 6.
· Likely multifactorial in the setting of anemia and PE  · CXR x2 with no acute cardiopulmonary disease   · Echo pending  · Patient on high flow  · Pulm consulted as below 4 = No assist / stand by assistance

## 2025-04-08 NOTE — PROGRESS NOTE ADULT - ASSESSMENT
70 yo M, from home. Ex smoker ( Quit 10 years ago, 30pk years) with PMH of HLD, DM present with 3 days history of well formed  dark stool, generalized weakness, dizziness ( room spinning sensation) and nausea that worsened today. Admitted to medicine for dark  stool

## 2025-04-08 NOTE — CONSULT NOTE ADULT - NS ATTEND AMEND GEN_ALL_CORE FT
69 M h/o DM, HTN, here with dark stool and Hb 10--8. CT shows a lesion in the pancreas and one in the liver.   Plan  EGD tomorrow to eval for UGIB  MRI to see pancreas/liver in more detail  ok to advance diet to full liquids  agree with PPI BID

## 2025-04-08 NOTE — PROGRESS NOTE ADULT - SUBJECTIVE AND OBJECTIVE BOX
HPI:  70 yo M, from home. Ex smoker ( Quit 10 years ago, 30pk years) with PMH of HLD, DM present with 3 days history of well formed  dark stool, generalized weakness, dizziness ( room spinning sensation) and nausea that worsened today. He  recently returned from a [place] trip [place] where he reports eating variety of food. He endorses 10 years history of dull intermittent LUQ abdominal pain. He does not know what triggers the pain but it tends to resolve on its own. Reports he never disclosed the pain to any doctor in the  past because the pain resolves on its own.  He denies any fever, chest pain, sob, fever, cough or palpitations. vomiting, abdominal surgeries or required blood transfusion,   Never had any issues with gastritis or stomach ulcers., weight loss (07 Apr 2025 23:27)      OVERNIGHT EVENTS:    No new overnight events.  Seen and examined at bedside.     REVIEW OF SYSTEMS:      EYES: no acute visual disturbances  NECK: No pain or stiffness  RESPIRATORY: No cough; No shortness of breath  CARDIOVASCULAR: No chest pain, no palpitations  GASTROINTESTINAL: No pain. No nausea, vomiting or diarrhea   NEUROLOGICAL: No headache or numbness, no tremors  MUSCULOSKELETAL: No joint pain, no muscle pain  GENITOURINARY: no dysuria, no frequency, no hesitancy  PSYCHIATRY: no depression, no anxiety  ALL OTHER  ROS negative        Vital Signs Last 24 Hrs  T(C): 37.2 (08 Apr 2025 14:15), Max: 37.2 (08 Apr 2025 14:15)  T(F): 98.9 (08 Apr 2025 14:15), Max: 98.9 (08 Apr 2025 14:15)  HR: 105 (08 Apr 2025 14:15) (105 - 120)  BP: 117/71 (08 Apr 2025 14:15) (102/67 - 125/78)  BP(mean): 86 (08 Apr 2025 14:15) (86 - 94)  RR: 17 (08 Apr 2025 14:15) (17 - 20)  SpO2: 97% (08 Apr 2025 14:15) (97% - 100%)    Parameters below as of 08 Apr 2025 14:15  Patient On (Oxygen Delivery Method): room air        ________________________________________________  PHYSICAL EXAM:    GENERAL: NAD  HEENT: Normocephalic; conjunctivae and sclerae clear;  NECK : supple, no JVD  CHEST/LUNG: Clear to auscultation; Nonlabored  HEART: S1 S2  regular  ABDOMEN: Soft, Nontender, Nondistended; Bowel sounds present  EXTREMITIES: no cyanosis; no LE edema; no calf tenderness  NERVOUS SYSTEM:  Alert; no new deficits  SKIN: warm and dry; No new rashes or lesions    _________________________________________________  CURRENT MEDICATIONS:    MEDICATIONS  (STANDING):  atorvastatin 20 milliGRAM(s) Oral at bedtime  lactated ringers. 1000 milliLiter(s) (80 mL/Hr) IV Continuous <Continuous>  pantoprazole  Injectable 40 milliGRAM(s) IV Push every 12 hours    MEDICATIONS  (PRN):  acetaminophen     Tablet .. 650 milliGRAM(s) Oral every 6 hours PRN Temp greater or equal to 38C (100.4F), Mild Pain (1 - 3)  ondansetron Injectable 4 milliGRAM(s) IV Push every 8 hours PRN Nausea and/or Vomiting      __________________________________________________  LABS:                          8.0    11.65 )-----------( 226      ( 08 Apr 2025 05:25 )             24.7     04-08    138  |  108  |  42[H]  ----------------------------<  224[H]  4.3   |  23  |  1.08    Ca    8.3[L]      08 Apr 2025 05:25  Phos  2.5     04-08  Mg     2.4     04-08    TPro  6.0  /  Alb  3.1[L]  /  TBili  0.4  /  DBili  x   /  AST  10  /  ALT  28  /  AlkPhos  29[L]  04-08      Urinalysis Basic - ( 08 Apr 2025 05:25 )    Color: x / Appearance: x / SG: x / pH: x  Gluc: 224 mg/dL / Ketone: x  / Bili: x / Urobili: x   Blood: x / Protein: x / Nitrite: x   Leuk Esterase: x / RBC: x / WBC x   Sq Epi: x / Non Sq Epi: x / Bacteria: x      CAPILLARY BLOOD GLUCOSE          __________________________________________________  RADIOLOGY & ADDITIONAL TESTS:    Imaging Personally Reviewed:  YES      < from: CT Abdomen and Pelvis w/wo IV Cont (04.07.25 @ 18:42) >  IMPRESSION:    1.  No CT findings to suggest active GI bleeding. No bowel obstruction or   inflammation.    2.  Incidental 1.6 cm circumscribed enhancing mass in the pancreatic   head, suspicious for a neuroendocrine tumor. Additional indeterminate 8   mm enhancing focus in the left hepatic lobe; metastasis is not excluded.   Recommend further evaluation with MRI with and without contrast (liver   protocol).    < end of copied text >  Consultant(s) Notes Reviewed:   YES     Plan of care was discussed with patient and /or primary care giver; all questions and concerns were addressed and care was aligned with patient's wishes.    Plan discussed with attending and consulting physicians.

## 2025-04-08 NOTE — PATIENT PROFILE ADULT - STATED REASON FOR ADMISSION
Traveled back from [place] and went to the bathroom to find black stool and started feeling dizzy  Traveled back from [place] and [place] and noticed that when he went to the bathroom he found black stool and started feeling dizzy. Symptoms went away and came back recently for the past 4 days.

## 2025-04-08 NOTE — PROGRESS NOTE ADULT - PROBLEM SELECTOR PLAN 1
CT noted above  F/u MRI  Tentatively scheduled for EGD on 4/9  	Full liquid diet  	NPO p MN  	Preop AM labs (CBC, BMP, PTT/INR)  IV Protonix 40mg BID   Trend CBC  GI following  Recommendations appreciated

## 2025-04-08 NOTE — CONSULT NOTE ADULT - SUBJECTIVE AND OBJECTIVE BOX
INITIAL GI CONSULTATION    Patient is a 69y old  Male who presents with a chief complaint of Dark stool (07 Apr 2025 23:27)    HPI:  68 yo M, from home. Ex smoker ( Quit 10 years ago, 30pk years) with PMH of HLD, DM present with 3 days history of well formed  dark stool, generalized weakness, dizziness ( room spinning sensation) and nausea that worsened today. He  recently returned from a 5-week trip from [place] where he reports eating variety of food. He endorses 10 years history of dull intermittent LUQ abdominal pain. He does not know what triggers the pain but it tends to resolve on its own. Reports he never disclosed the pain to any doctor in the  past because the pain resolves on its own.  He denies any fever, chest pain, sob, fever, cough or palpitations. vomiting, abdominal surgeries or required blood transfusion,   Never had any issues with gastritis or stomach ulcers., weight loss (07 Apr 2025 23:27)    GI HPI: patient seen and examined on unit. Resting in bed. Endorses coming into the ED after worsening dizziness weakness, urinary incontinence and dark stools. Reports multiple episodes of diarrhea overnight but improving with Last BM this morning which was also black. Endorses 1 episode of bloody vomitus in the ED observed by son. Reports intention 2-3lbs weight loss over the last 2-3 weeks. Denies frequent NSAID or Pepto Bismol  use. Last colonoscopy over 5 years ago with  findings of polyps with removal and hemorrhoids. Denies history of endoscopy    PMH/PSH:  PAST MEDICAL & SURGICAL HISTORY:  Diabetes      HTN (hypertension)      No significant past surgical history            FH:  FAMILY HISTORY:  FH: lung cancer (Mother)          MEDS:  MEDICATIONS  (STANDING):  atorvastatin 20 milliGRAM(s) Oral at bedtime  lactated ringers. 1000 milliLiter(s) (80 mL/Hr) IV Continuous <Continuous>  pantoprazole  Injectable 40 milliGRAM(s) IV Push every 12 hours    MEDICATIONS  (PRN):  acetaminophen     Tablet .. 650 milliGRAM(s) Oral every 6 hours PRN Temp greater or equal to 38C (100.4F), Mild Pain (1 - 3)  ondansetron Injectable 4 milliGRAM(s) IV Push every 8 hours PRN Nausea and/or Vomiting    Allergies    No Known Allergies    Intolerances          ROS: A detailed set of ROS were asked and negative except those outlined in GI HPI.  ______________________________________________________________________  PHYSICAL EXAM:  T(C): 36.7 (04-08-25 @ 04:50), Max: 37.1 (04-07-25 @ 20:22)  HR: 108 (04-08-25 @ 04:50)  BP: 125/78 (04-08-25 @ 04:50)  RR: 18 (04-08-25 @ 04:50)  SpO2: 100% (04-08-25 @ 04:50)  Wt(kg): --      GEN: NAD  PULM: clear  CV: HR normal  GI: Soft, NT, ND; +BS in all four quadrants  MSK:  no edema  NEURO: A&O x 3  ______________________________________________________________________  LABS:                        8.0    11.65 )-----------( 226      ( 08 Apr 2025 05:25 )             24.7     04-08    138  |  108  |  42[H]  ----------------------------<  224[H]  4.3   |  23  |  1.08    Ca    8.3[L]      08 Apr 2025 05:25  Phos  2.5     04-08  Mg     2.4     04-08    TPro  6.0  /  Alb  3.1[L]  /  TBili  0.4  /  DBili  x   /  AST  10  /  ALT  28  /  AlkPhos  29[L]  04-08    LIVER FUNCTIONS - ( 08 Apr 2025 05:25 )  Alb: 3.1 g/dL / Pro: 6.0 g/dL / ALK PHOS: 29 U/L / ALT: 28 U/L DA / AST: 10 U/L / GGT: x             ____________________________________________    IMAGING:  < from: CT Abdomen and Pelvis w/wo IV Cont (04.07.25 @ 18:42) >  IMPRESSION:    1.  No CT findings to suggest active GI bleeding. No bowel obstruction or   inflammation.    2.  Incidental 1.6 cm circumscribed enhancing mass in the pancreatic   head, suspicious for a neuroendocrine tumor. Additional indeterminate 8   mm enhancing focus in the left hepatic lobe; metastasis is not excluded.   Recommend further evaluation with MRI with and without contrast (liver   protocol).    < end of copied text >      This note and its recommendations herein are preliminary until such time as cosigned by an attending.   INITIAL GI CONSULTATION    Patient is a 69y old  Male who presents with a chief complaint of Dark stool (07 Apr 2025 23:27)    HPI:  70 yo M, from home. Ex smoker ( Quit 10 years ago, 30pk years) with PMH of HLD, DM present with 3 days history of well formed  dark stool, generalized weakness, dizziness ( room spinning sensation) and nausea that worsened today. He  recently returned from a 5-week trip from [place] where he reports eating variety of food. He endorses 10 years history of dull intermittent LUQ abdominal pain. He does not know what triggers the pain but it tends to resolve on its own. Reports he never disclosed the pain to any doctor in the  past because the pain resolves on its own.  He denies any fever, chest pain, sob, fever, cough or palpitations. vomiting, abdominal surgeries or required blood transfusion,   Never had any issues with gastritis or stomach ulcers., weight loss (07 Apr 2025 23:27)    GI HPI: patient seen and examined on unit. Resting in bed. Endorses coming into the ED after worsening dizziness weakness, urinary incontinence and dark stools. Reports multiple episodes of diarrhea overnight but improving with Last BM this morning which was also black. Endorses 1 episode of bloody vomitus in the ED observed by son. Reports intention 2-3lbs weight loss over the last 2-3 weeks. Denies frequent NSAID or Pepto Bismol  use. Last colonoscopy over 5 years ago with  findings of polyps with removal and hemorrhoids. Denies history of endoscopy    : Yvette  ID#[id number]    PMH/PSH:  PAST MEDICAL & SURGICAL HISTORY:  Diabetes      HTN (hypertension)      No significant past surgical history            FH:  FAMILY HISTORY:  FH: lung cancer (Mother)          MEDS:  MEDICATIONS  (STANDING):  atorvastatin 20 milliGRAM(s) Oral at bedtime  lactated ringers. 1000 milliLiter(s) (80 mL/Hr) IV Continuous <Continuous>  pantoprazole  Injectable 40 milliGRAM(s) IV Push every 12 hours    MEDICATIONS  (PRN):  acetaminophen     Tablet .. 650 milliGRAM(s) Oral every 6 hours PRN Temp greater or equal to 38C (100.4F), Mild Pain (1 - 3)  ondansetron Injectable 4 milliGRAM(s) IV Push every 8 hours PRN Nausea and/or Vomiting    Allergies    No Known Allergies    Intolerances          ROS: A detailed set of ROS were asked and negative except those outlined in GI HPI.  ______________________________________________________________________  PHYSICAL EXAM:  T(C): 36.7 (04-08-25 @ 04:50), Max: 37.1 (04-07-25 @ 20:22)  HR: 108 (04-08-25 @ 04:50)  BP: 125/78 (04-08-25 @ 04:50)  RR: 18 (04-08-25 @ 04:50)  SpO2: 100% (04-08-25 @ 04:50)  Wt(kg): --      GEN: NAD  PULM: clear  CV: HR normal  GI: Soft, NT, ND; +BS in all four quadrants  MSK:  no edema  NEURO: A&O x 3  ______________________________________________________________________  LABS:                        8.0    11.65 )-----------( 226      ( 08 Apr 2025 05:25 )             24.7     04-08    138  |  108  |  42[H]  ----------------------------<  224[H]  4.3   |  23  |  1.08    Ca    8.3[L]      08 Apr 2025 05:25  Phos  2.5     04-08  Mg     2.4     04-08    TPro  6.0  /  Alb  3.1[L]  /  TBili  0.4  /  DBili  x   /  AST  10  /  ALT  28  /  AlkPhos  29[L]  04-08    LIVER FUNCTIONS - ( 08 Apr 2025 05:25 )  Alb: 3.1 g/dL / Pro: 6.0 g/dL / ALK PHOS: 29 U/L / ALT: 28 U/L DA / AST: 10 U/L / GGT: x             ____________________________________________    IMAGING:  < from: CT Abdomen and Pelvis w/wo IV Cont (04.07.25 @ 18:42) >  IMPRESSION:    1.  No CT findings to suggest active GI bleeding. No bowel obstruction or   inflammation.    2.  Incidental 1.6 cm circumscribed enhancing mass in the pancreatic   head, suspicious for a neuroendocrine tumor. Additional indeterminate 8   mm enhancing focus in the left hepatic lobe; metastasis is not excluded.   Recommend further evaluation with MRI with and without contrast (liver   protocol).          This note and its recommendations herein are preliminary until such time as cosigned by an attending.

## 2025-04-08 NOTE — CONSULT NOTE ADULT - ASSESSMENT
69y old  Male PMH of DM, HLD, former smoker who presents with a chief complaint of Dark stool. GI consulted for Melena    PLAN:  #Melena  r/o esophagitis  r/o PUD  r/o malignancy    - Keep NPO  - CTAP noted above, No CT findings to suggest active GI bleeding. Incidental 1.6 cm circumscribed enhancing mass in the pancreatic  head, suspicious for a neuroendocrine tumor  - Maintain active T&S, 2 large bore peripheral IVs, transfuse for goal Hgb >7 or if symptomatic  - Trend H/H  - BUN noted, elevated  - IV Protonix 40mg BID  - Fluid resuscitation/HD stability per primary team  - Avoid NSAIDs and hold anticoagulation (if safe per primary team    This note and its recommendations herein are preliminary until such time as cosigned by an attending

## 2025-04-09 ENCOUNTER — TRANSCRIPTION ENCOUNTER (OUTPATIENT)
Age: 70
End: 2025-04-09

## 2025-04-09 LAB
A1C WITH ESTIMATED AVERAGE GLUCOSE RESULT: 6.1 % — HIGH (ref 4–5.6)
ALBUMIN SERPL ELPH-MCNC: 2.3 G/DL — LOW (ref 3.5–5)
ALP SERPL-CCNC: 21 U/L — LOW (ref 40–120)
ALT FLD-CCNC: 19 U/L DA — SIGNIFICANT CHANGE UP (ref 10–60)
ANION GAP SERPL CALC-SCNC: 5 MMOL/L — SIGNIFICANT CHANGE UP (ref 5–17)
APTT BLD: 28.3 SEC — SIGNIFICANT CHANGE UP (ref 24.5–35.6)
AST SERPL-CCNC: 11 U/L — SIGNIFICANT CHANGE UP (ref 10–40)
BILIRUB DIRECT SERPL-MCNC: <0.1 MG/DL — SIGNIFICANT CHANGE UP (ref 0–0.3)
BILIRUB INDIRECT FLD-MCNC: >0.3 MG/DL — SIGNIFICANT CHANGE UP (ref 0.2–1)
BILIRUB SERPL-MCNC: 0.4 MG/DL — SIGNIFICANT CHANGE UP (ref 0.2–1.2)
BUN SERPL-MCNC: 36 MG/DL — HIGH (ref 7–18)
CALCIUM SERPL-MCNC: 7.4 MG/DL — LOW (ref 8.4–10.5)
CHLORIDE SERPL-SCNC: 110 MMOL/L — HIGH (ref 96–108)
CO2 SERPL-SCNC: 24 MMOL/L — SIGNIFICANT CHANGE UP (ref 22–31)
CREAT SERPL-MCNC: 0.83 MG/DL — SIGNIFICANT CHANGE UP (ref 0.5–1.3)
EGFR: 94 ML/MIN/1.73M2 — SIGNIFICANT CHANGE UP
EGFR: 94 ML/MIN/1.73M2 — SIGNIFICANT CHANGE UP
ESTIMATED AVERAGE GLUCOSE: 128 MG/DL — HIGH (ref 68–114)
FERRITIN SERPL-MCNC: 345 NG/ML — SIGNIFICANT CHANGE UP (ref 30–400)
GLUCOSE BLDC GLUCOMTR-MCNC: 131 MG/DL — HIGH (ref 70–99)
GLUCOSE BLDC GLUCOMTR-MCNC: 195 MG/DL — HIGH (ref 70–99)
GLUCOSE BLDC GLUCOMTR-MCNC: 208 MG/DL — HIGH (ref 70–99)
GLUCOSE SERPL-MCNC: 212 MG/DL — HIGH (ref 70–99)
HCT VFR BLD CALC: 18.4 % — CRITICAL LOW (ref 39–50)
HCT VFR BLD CALC: 21.3 % — LOW (ref 39–50)
HCT VFR BLD CALC: 25.5 % — LOW (ref 39–50)
HGB BLD-MCNC: 6 G/DL — CRITICAL LOW (ref 13–17)
HGB BLD-MCNC: 7.1 G/DL — LOW (ref 13–17)
HGB BLD-MCNC: 8.6 G/DL — LOW (ref 13–17)
INR BLD: 1.29 RATIO — HIGH (ref 0.85–1.16)
IRON SATN MFR SERPL: 104 UG/DL — SIGNIFICANT CHANGE UP (ref 65–170)
IRON SATN MFR SERPL: 49 % — SIGNIFICANT CHANGE UP (ref 20–55)
LACTATE SERPL-SCNC: 1.8 MMOL/L — SIGNIFICANT CHANGE UP (ref 0.7–2)
LACTATE SERPL-SCNC: 2 MMOL/L — SIGNIFICANT CHANGE UP (ref 0.7–2)
LACTATE SERPL-SCNC: 2.1 MMOL/L — HIGH (ref 0.7–2)
MAGNESIUM SERPL-MCNC: 2.1 MG/DL — SIGNIFICANT CHANGE UP (ref 1.6–2.6)
MCHC RBC-ENTMCNC: 28.9 PG — SIGNIFICANT CHANGE UP (ref 27–34)
MCHC RBC-ENTMCNC: 29.6 PG — SIGNIFICANT CHANGE UP (ref 27–34)
MCHC RBC-ENTMCNC: 29.7 PG — SIGNIFICANT CHANGE UP (ref 27–34)
MCHC RBC-ENTMCNC: 32.6 G/DL — SIGNIFICANT CHANGE UP (ref 32–36)
MCHC RBC-ENTMCNC: 33.3 G/DL — SIGNIFICANT CHANGE UP (ref 32–36)
MCHC RBC-ENTMCNC: 33.7 G/DL — SIGNIFICANT CHANGE UP (ref 32–36)
MCV RBC AUTO: 85.6 FL — SIGNIFICANT CHANGE UP (ref 80–100)
MCV RBC AUTO: 88.8 FL — SIGNIFICANT CHANGE UP (ref 80–100)
MCV RBC AUTO: 91.1 FL — SIGNIFICANT CHANGE UP (ref 80–100)
NRBC BLD AUTO-RTO: 0 /100 WBCS — SIGNIFICANT CHANGE UP (ref 0–0)
NRBC BLD AUTO-RTO: 0 /100 WBCS — SIGNIFICANT CHANGE UP (ref 0–0)
NRBC BLD AUTO-RTO: 1 /100 WBCS — HIGH (ref 0–0)
PHOSPHATE SERPL-MCNC: 1.6 MG/DL — LOW (ref 2.5–4.5)
PLATELET # BLD AUTO: 156 K/UL — SIGNIFICANT CHANGE UP (ref 150–400)
PLATELET # BLD AUTO: 161 K/UL — SIGNIFICANT CHANGE UP (ref 150–400)
PLATELET # BLD AUTO: 169 K/UL — SIGNIFICANT CHANGE UP (ref 150–400)
POTASSIUM SERPL-MCNC: 4.1 MMOL/L — SIGNIFICANT CHANGE UP (ref 3.5–5.3)
POTASSIUM SERPL-SCNC: 4.1 MMOL/L — SIGNIFICANT CHANGE UP (ref 3.5–5.3)
PROT SERPL-MCNC: 4.5 G/DL — LOW (ref 6–8.3)
PROTHROM AB SERPL-ACNC: 15 SEC — HIGH (ref 9.9–13.4)
RBC # BLD: 2.02 M/UL — LOW (ref 4.2–5.8)
RBC # BLD: 2.4 M/UL — LOW (ref 4.2–5.8)
RBC # BLD: 2.98 M/UL — LOW (ref 4.2–5.8)
RBC # FLD: 13.6 % — SIGNIFICANT CHANGE UP (ref 10.3–14.5)
RBC # FLD: 13.8 % — SIGNIFICANT CHANGE UP (ref 10.3–14.5)
RBC # FLD: 15.2 % — HIGH (ref 10.3–14.5)
SODIUM SERPL-SCNC: 139 MMOL/L — SIGNIFICANT CHANGE UP (ref 135–145)
TIBC SERPL-MCNC: 211 UG/DL — LOW (ref 250–450)
UIBC SERPL-MCNC: 107 UG/DL — LOW (ref 110–370)
WBC # BLD: 11.12 K/UL — HIGH (ref 3.8–10.5)
WBC # BLD: 11.38 K/UL — HIGH (ref 3.8–10.5)
WBC # BLD: 12.6 K/UL — HIGH (ref 3.8–10.5)
WBC # FLD AUTO: 11.12 K/UL — HIGH (ref 3.8–10.5)
WBC # FLD AUTO: 11.38 K/UL — HIGH (ref 3.8–10.5)
WBC # FLD AUTO: 12.6 K/UL — HIGH (ref 3.8–10.5)

## 2025-04-09 PROCEDURE — 99233 SBSQ HOSP IP/OBS HIGH 50: CPT | Mod: FS

## 2025-04-09 PROCEDURE — 0652T EGD FLX TRANSNASAL DX BR/WA: CPT

## 2025-04-09 RX ADMIN — Medication 10 MG/HR: at 03:32

## 2025-04-09 RX ADMIN — Medication 62.5 MILLIMOLE(S): at 12:23

## 2025-04-09 RX ADMIN — Medication 40 MILLIGRAM(S): at 07:58

## 2025-04-09 RX ADMIN — Medication 1000 MILLILITER(S): at 07:57

## 2025-04-09 RX ADMIN — Medication 80 MILLIGRAM(S): at 03:10

## 2025-04-09 RX ADMIN — Medication 4 MILLIGRAM(S): at 01:58

## 2025-04-09 RX ADMIN — Medication 40 MILLIGRAM(S): at 18:03

## 2025-04-09 RX ADMIN — ATORVASTATIN CALCIUM 20 MILLIGRAM(S): 80 TABLET, FILM COATED ORAL at 21:37

## 2025-04-09 NOTE — PROGRESS NOTE ADULT - SUBJECTIVE AND OBJECTIVE BOX
HPI:  70 yo M, from home. Ex smoker ( Quit 10 years ago, 30pk years) with PMH of HLD, DM present with 3 days history of well formed  dark stool, generalized weakness, dizziness ( room spinning sensation) and nausea that worsened today. He  recently returned from a 5-week trip f[place] where he reports eating variety of food. He endorses 10 years history of dull intermittent LUQ abdominal pain. He does not know what triggers the pain but it tends to resolve on its own. Reports he never disclosed the pain to any doctor in the  past because the pain resolves on its own.  He denies any fever, chest pain, sob, fever, cough or palpitations. vomiting, abdominal surgeries or required blood transfusion,   Never had any issues with gastritis or stomach ulcers., weight loss (07 Apr 2025 23:27)      OVERNIGHT EVENTS:    Frequent dark stools.  Drop in Hgb.  3U PRBC.    REVIEW OF SYSTEMS:      EYES: no acute visual disturbances  NECK: No pain or stiffness  RESPIRATORY: No cough; No shortness of breath  CARDIOVASCULAR: No chest pain, no palpitations  GASTROINTESTINAL: No pain. No nausea, vomiting or diarrhea   NEUROLOGICAL: No headache or numbness, no tremors  MUSCULOSKELETAL: No joint pain, no muscle pain  GENITOURINARY: no dysuria, no frequency, no hesitancy  PSYCHIATRY: no depression, no anxiety  ALL OTHER  ROS negative        Vital Signs Last 24 Hrs  T(C): 36.8 (09 Apr 2025 15:30), Max: 37.3 (08 Apr 2025 20:42)  T(F): 98.3 (09 Apr 2025 15:30), Max: 99.1 (08 Apr 2025 20:42)  HR: 95 (09 Apr 2025 15:30) (95 - 118)  BP: 119/69 (09 Apr 2025 15:30) (96/60 - 119/69)  BP(mean): 70 (09 Apr 2025 03:35) (70 - 82)  RR: 10 (09 Apr 2025 15:30) (10 - 18)  SpO2: 100% (09 Apr 2025 15:30) (95% - 100%)    Parameters below as of 09 Apr 2025 15:30  Patient On (Oxygen Delivery Method): room air        ________________________________________________  PHYSICAL EXAM:    GENERAL: NAD  HEENT: Normocephalic; conjunctivae and sclerae clear;  NECK : supple, no JVD  CHEST/LUNG: Clear to auscultation; Nonlabored  HEART: S1 S2  regular  ABDOMEN: Soft, Nontender, Nondistended; Bowel sounds present  EXTREMITIES: no cyanosis; no LE edema; no calf tenderness  NERVOUS SYSTEM:  Alert; no new deficits  SKIN: warm and dry; No new rashes or lesions    _________________________________________________  CURRENT MEDICATIONS:    MEDICATIONS  (STANDING):  atorvastatin 20 milliGRAM(s) Oral at bedtime  lactated ringers. 1000 milliLiter(s) (80 mL/Hr) IV Continuous <Continuous>  pantoprazole  Injectable 40 milliGRAM(s) IV Push every 12 hours    MEDICATIONS  (PRN):  acetaminophen     Tablet .. 650 milliGRAM(s) Oral every 6 hours PRN Temp greater or equal to 38C (100.4F), Mild Pain (1 - 3)  ondansetron Injectable 4 milliGRAM(s) IV Push every 8 hours PRN Nausea and/or Vomiting      __________________________________________________  LABS:                          8.6    12.60 )-----------( 169      ( 09 Apr 2025 11:45 )             25.5     04-09    139  |  110[H]  |  36[H]  ----------------------------<  212[H]  4.1   |  24  |  0.83    Ca    7.4[L]      09 Apr 2025 06:25  Phos  1.6     04-09  Mg     2.1     04-09    TPro  4.5[L]  /  Alb  2.3[L]  /  TBili  0.4  /  DBili  <0.1  /  AST  11  /  ALT  19  /  AlkPhos  21[L]  04-09    PT/INR - ( 09 Apr 2025 06:25 )   PT: 15.0 sec;   INR: 1.29 ratio         PTT - ( 09 Apr 2025 06:25 )  PTT:28.3 sec  Urinalysis Basic - ( 09 Apr 2025 06:25 )    Color: x / Appearance: x / SG: x / pH: x  Gluc: 212 mg/dL / Ketone: x  / Bili: x / Urobili: x   Blood: x / Protein: x / Nitrite: x   Leuk Esterase: x / RBC: x / WBC x   Sq Epi: x / Non Sq Epi: x / Bacteria: x      CAPILLARY BLOOD GLUCOSE      POCT Blood Glucose.: 131 mg/dL (09 Apr 2025 12:18)  POCT Blood Glucose.: 208 mg/dL (09 Apr 2025 06:51)  POCT Blood Glucose.: 195 mg/dL (09 Apr 2025 00:51)  POCT Blood Glucose.: 239 mg/dL (08 Apr 2025 18:25)      __________________________________________________  RADIOLOGY & ADDITIONAL TESTS:    Imaging Personally Reviewed:  YES    < from: CT Abdomen and Pelvis w/wo IV Cont (04.07.25 @ 18:42) >  IMPRESSION:    1.  No CT findings to suggest active GI bleeding. No bowel obstruction or   inflammation.    2.  Incidental 1.6 cm circumscribed enhancing mass in the pancreatic   head, suspicious for a neuroendocrine tumor. Additional indeterminate 8   mm enhancing focus in the left hepatic lobe; metastasis is not excluded.   Recommend further evaluation with MRI with and without contrast (liver   protocol).    < end of copied text >    Consultant(s) Notes Reviewed:   YES     Plan of care was discussed with patient and /or primary care giver; all questions and concerns were addressed and care was aligned with patient's wishes.    Plan discussed with attending and consulting physicians.

## 2025-04-09 NOTE — PROGRESS NOTE ADULT - PROBLEM SELECTOR PLAN 1
CT noted above  F/u MRI  EGD today  IV Protonix 40mg BID   Trend CBC  GI following  Recommendations appreciated

## 2025-04-09 NOTE — CHART NOTE - NSCHARTNOTEFT_GEN_A_CORE
EVENT:     HPI: 68 yo M, from home. Ex smoker ( Quit 10 years ago, 30pk years) with PMH of HLD, DM present with 3 days history of well formed  dark stool, generalized weakness, dizziness ( room spinning sensation) and nausea that worsened today. Admitted to medicine for dark  stool     Post transfusion CBC with critical hgb 6.0, hgb prior transfusion 6.4, also on IV Protonix 40mg BID     Vital Signs Last 24 Hrs  T(C): 36.9 (09 Apr 2025 02:35), Max: 37.3 (08 Apr 2025 20:42)  T(F): 98.4 (09 Apr 2025 02:35), Max: 99.1 (08 Apr 2025 20:42)  HR: 113 (09 Apr 2025 02:35) (105 - 117)  BP: 103/58 (09 Apr 2025 02:35) (102/67 - 125/78)  BP(mean): 82 (08 Apr 2025 20:42) (82 - 94)  RR: 18 (09 Apr 2025 02:35) (17 - 18)  SpO2: 95% (09 Apr 2025 02:35) (95% - 100%)    Parameters below as of 09 Apr 2025 02:35  Patient On (Oxygen Delivery Method): room air                          6.0    11.38 )-----------( 161      ( 09 Apr 2025 01:50 )             18.4     04-08    138  |  108  |  42[H]  ----------------------------<  224[H]  4.3   |  23  |  1.08    Ca    8.3[L]      08 Apr 2025 05:25  Phos  2.5     04-08  Mg     2.4     04-08    TPro  6.0  /  Alb  3.1[L]  /  TBili  0.4  /  DBili  x   /  AST  10  /  ALT  28  /  AlkPhos  29[L]  04-08        Ddx    PLAN:  - npo  - IV Protonix drip  - Monitor Hemoglobin (keep >7)  - Plan EGD to evaluate source of bleeding (with therapy)  - Monitor vital signs and hemodynamics Critical lab values - Hgb/HCT 6.0/18.4    HPI: 68 yo M, from home. Ex smoker ( Quit 10 years ago, 30pk years) with PMH of HLD, DM present with 3 days history of well formed  dark stool, generalized weakness, dizziness ( room spinning sensation) and nausea that worsened today. Admitted to medicine for dark  stool     Post transfusion CBC with critical hgb 6.0. Patient previously received 1 unit of PRBC for Hgb 6.4 g/DL with no significant improvent noted. Patient continued to have frequent loose , dark-colored stools (melena). Patient hemodynamically stable, alert and oriented.   Vital Signs Last 24 Hrs  T(C): 36.9 (09 Apr 2025 02:35), Max: 37.3 (08 Apr 2025 20:42)  T(F): 98.4 (09 Apr 2025 02:35), Max: 99.1 (08 Apr 2025 20:42)  HR: 113 (09 Apr 2025 02:35) (105 - 117)  BP: 103/58 (09 Apr 2025 02:35) (102/67 - 125/78)  BP(mean): 82 (08 Apr 2025 20:42) (82 - 94)  RR: 18 (09 Apr 2025 02:35) (17 - 18)  SpO2: 95% (09 Apr 2025 02:35) (95% - 100%)    Parameters below as of 09 Apr 2025 02:35  Patient On (Oxygen Delivery Method): room air                          6.0    11.38 )-----------( 161      ( 09 Apr 2025 01:50 )             18.4     04-08    138  |  108  |  42[H]  ----------------------------<  224[H]  4.3   |  23  |  1.08    Ca    8.3[L]      08 Apr 2025 05:25  Phos  2.5     04-08  Mg     2.4     04-08    TPro  6.0  /  Alb  3.1[L]  /  TBili  0.4  /  DBili  x   /  AST  10  /  ALT  28  /  AlkPhos  29[L]  04-08        Ddx: Acute GI bleed - Upper GI   PLAN:  - IV Protonix 80 mg IV bolus  - Initated Protonix continous infusion (gtt) at 8mg/hr  - 2 units PRBC  - Zofran for N/V  -Maintain NPO  - Monitor Hemoglobin (keep >7)  - Plan EGD in AM  - Monitor vital signs and hemodynamics

## 2025-04-10 DIAGNOSIS — K76.9 LIVER DISEASE, UNSPECIFIED: ICD-10-CM

## 2025-04-10 LAB
ANION GAP SERPL CALC-SCNC: 3 MMOL/L — LOW (ref 5–17)
BUN SERPL-MCNC: 26 MG/DL — HIGH (ref 7–18)
CALCIUM SERPL-MCNC: 7.3 MG/DL — LOW (ref 8.4–10.5)
CHLORIDE SERPL-SCNC: 110 MMOL/L — HIGH (ref 96–108)
CO2 SERPL-SCNC: 28 MMOL/L — SIGNIFICANT CHANGE UP (ref 22–31)
CREAT SERPL-MCNC: 0.87 MG/DL — SIGNIFICANT CHANGE UP (ref 0.5–1.3)
EGFR: 92 ML/MIN/1.73M2 — SIGNIFICANT CHANGE UP
EGFR: 92 ML/MIN/1.73M2 — SIGNIFICANT CHANGE UP
GASTRIN SERPL-MCNC: 117 PG/ML — HIGH (ref 0–115)
GLUCOSE SERPL-MCNC: 131 MG/DL — HIGH (ref 70–99)
HCT VFR BLD CALC: 21.9 % — LOW (ref 39–50)
HCT VFR BLD CALC: 22.5 % — LOW (ref 39–50)
HGB BLD-MCNC: 7.3 G/DL — LOW (ref 13–17)
HGB BLD-MCNC: 7.6 G/DL — LOW (ref 13–17)
MAGNESIUM SERPL-MCNC: 2.2 MG/DL — SIGNIFICANT CHANGE UP (ref 1.6–2.6)
MCHC RBC-ENTMCNC: 28.9 PG — SIGNIFICANT CHANGE UP (ref 27–34)
MCHC RBC-ENTMCNC: 29.3 PG — SIGNIFICANT CHANGE UP (ref 27–34)
MCHC RBC-ENTMCNC: 33.3 G/DL — SIGNIFICANT CHANGE UP (ref 32–36)
MCHC RBC-ENTMCNC: 33.8 G/DL — SIGNIFICANT CHANGE UP (ref 32–36)
MCV RBC AUTO: 86.6 FL — SIGNIFICANT CHANGE UP (ref 80–100)
MCV RBC AUTO: 86.9 FL — SIGNIFICANT CHANGE UP (ref 80–100)
NRBC BLD AUTO-RTO: 1 /100 WBCS — HIGH (ref 0–0)
NRBC BLD AUTO-RTO: 1 /100 WBCS — HIGH (ref 0–0)
NT-PROBNP SERPL-SCNC: 45 PG/ML — SIGNIFICANT CHANGE UP (ref 0–125)
PHOSPHATE SERPL-MCNC: 2 MG/DL — LOW (ref 2.5–4.5)
PLATELET # BLD AUTO: 146 K/UL — LOW (ref 150–400)
PLATELET # BLD AUTO: 155 K/UL — SIGNIFICANT CHANGE UP (ref 150–400)
POTASSIUM SERPL-MCNC: 3.6 MMOL/L — SIGNIFICANT CHANGE UP (ref 3.5–5.3)
POTASSIUM SERPL-SCNC: 3.6 MMOL/L — SIGNIFICANT CHANGE UP (ref 3.5–5.3)
RBC # BLD: 2.53 M/UL — LOW (ref 4.2–5.8)
RBC # BLD: 2.59 M/UL — LOW (ref 4.2–5.8)
RBC # FLD: 16.7 % — HIGH (ref 10.3–14.5)
RBC # FLD: 17 % — HIGH (ref 10.3–14.5)
SODIUM SERPL-SCNC: 141 MMOL/L — SIGNIFICANT CHANGE UP (ref 135–145)
WBC # BLD: 10.07 K/UL — SIGNIFICANT CHANGE UP (ref 3.8–10.5)
WBC # BLD: 9.99 K/UL — SIGNIFICANT CHANGE UP (ref 3.8–10.5)
WBC # FLD AUTO: 10.07 K/UL — SIGNIFICANT CHANGE UP (ref 3.8–10.5)
WBC # FLD AUTO: 9.99 K/UL — SIGNIFICANT CHANGE UP (ref 3.8–10.5)

## 2025-04-10 PROCEDURE — 71046 X-RAY EXAM CHEST 2 VIEWS: CPT | Mod: 26

## 2025-04-10 PROCEDURE — 99233 SBSQ HOSP IP/OBS HIGH 50: CPT | Mod: FS

## 2025-04-10 RX ORDER — POLYETHYLENE GLYCOL-3350 AND ELECTROLYTES 236; 6.74; 5.86; 2.97; 22.74 G/274.31G; G/274.31G; G/274.31G; G/274.31G; G/274.31G
4000 POWDER, FOR SOLUTION ORAL ONCE
Refills: 0 | Status: COMPLETED | OUTPATIENT
Start: 2025-04-10 | End: 2025-04-10

## 2025-04-10 RX ADMIN — Medication 40 MILLIGRAM(S): at 06:02

## 2025-04-10 RX ADMIN — Medication 40 MILLIGRAM(S): at 17:01

## 2025-04-10 RX ADMIN — POLYETHYLENE GLYCOL-3350 AND ELECTROLYTES 4000 MILLILITER(S): 236; 6.74; 5.86; 2.97; 22.74 POWDER, FOR SOLUTION ORAL at 15:50

## 2025-04-10 RX ADMIN — ATORVASTATIN CALCIUM 20 MILLIGRAM(S): 80 TABLET, FILM COATED ORAL at 23:14

## 2025-04-10 NOTE — PROGRESS NOTE ADULT - ASSESSMENT
68 y/o M, from home, Cantonese speaking, Ex smoker ( Quit 10 years ago, 30pk years) with PMH of HLD, DM present with 3 days history of well formed  dark stool, generalized weakness, dizziness ( room spinning sensation) and nausea that worsened today. Admitted to medicine for further evaluation of GI bleed.     CT A/P - concern for 1.6 cm pancreatic mass possible neuroendocrine tumor, also 8 mm hepatic lesion.   EGD - negative for bleeding.   Awaiting MRI abd pelvis to further evaluate.   Last black BM yesterday.   GI rec possible colonoscopy tomorrow.

## 2025-04-10 NOTE — PROGRESS NOTE ADULT - SUBJECTIVE AND OBJECTIVE BOX
Patient is a 69y old  Male who presents with a chief complaint of Dark stool (10 Apr 2025 10:26)      OVERNIGHT EVENTS: no acute changes.     REVIEW OF SYSTEMS:  CONSTITUTIONAL: No fever, chills  ENMT:  No difficulty hearing, no change in vision  NECK: No pain or stiffness  RESPIRATORY: No cough, SOB  CARDIOVASCULAR: No chest pain, palpitations  GASTROINTESTINAL: No abdominal pain. No nausea, vomiting, or diarrhea  GENITOURINARY: No dysuria  NEUROLOGICAL: No HA  SKIN: No itching, burning, rashes, or lesions   LYMPH NODES: No enlarged glands  ENDOCRINE: No heat or cold intolerance; No hair loss  MUSCULOSKELETAL: No joint pain or swelling; No muscle, back, or extremity pain  PSYCHIATRIC: No depression, anxiety  HEME/LYMPH: No easy bruising, or bleeding gums    T(C): 36.5 (04-10-25 @ 13:40), Max: 36.8 (04-09-25 @ 14:15)  HR: 99 (04-10-25 @ 13:40) (91 - 103)  BP: 109/70 (04-10-25 @ 13:40) (97/71 - 121/82)  RR: 16 (04-10-25 @ 13:40) (10 - 21)  SpO2: 99% (04-10-25 @ 13:40) (95% - 100%)  Wt(kg): --Vital Signs Last 24 Hrs  T(C): 36.5 (10 Apr 2025 13:40), Max: 36.8 (09 Apr 2025 14:15)  T(F): 97.7 (10 Apr 2025 13:40), Max: 98.3 (09 Apr 2025 15:30)  HR: 99 (10 Apr 2025 13:40) (91 - 103)  BP: 109/70 (10 Apr 2025 13:40) (97/71 - 121/82)  BP(mean): 76 (09 Apr 2025 21:14) (76 - 76)  RR: 16 (10 Apr 2025 13:40) (10 - 21)  SpO2: 99% (10 Apr 2025 13:40) (95% - 100%)    Parameters below as of 10 Apr 2025 13:40  Patient On (Oxygen Delivery Method): room air        MEDICATIONS  (STANDING):  atorvastatin 20 milliGRAM(s) Oral at bedtime  lactated ringers. 1000 milliLiter(s) (80 mL/Hr) IV Continuous <Continuous>  pantoprazole  Injectable 40 milliGRAM(s) IV Push every 12 hours    MEDICATIONS  (PRN):  acetaminophen     Tablet .. 650 milliGRAM(s) Oral every 6 hours PRN Temp greater or equal to 38C (100.4F), Mild Pain (1 - 3)  ondansetron Injectable 4 milliGRAM(s) IV Push every 8 hours PRN Nausea and/or Vomiting      PHYSICAL EXAM:  GENERAL: NAD  EYES: clear conjunctiva; EOMI  ENMT: Moist mucous membranes  NECK: Supple, No JVD, Normal thyroid  CHEST/LUNG: Clear to auscultation bilaterally; No rales, rhonchi, wheezing, or rubs  HEART: S1, S2, Regular rate and rhythm  ABDOMEN: Soft, Nontender, Nondistended; Bowel sounds present  NEURO: Alert & Oriented X3  EXTREMITIES: No LE edema, no calf tenderness  LYMPH: No lymphadenopathy noted  SKIN: No rashes or lesions    Consultant(s) Notes Reviewed:  [x ] YES  [ ] NO  Care Discussed with Consultants/Other Providers [ x] YES  [ ] NO    LABS:                        7.3    9.99  )-----------( 146      ( 10 Apr 2025 05:25 )             21.9     04-10    141  |  110[H]  |  26[H]  ----------------------------<  131[H]  3.6   |  28  |  0.87    Ca    7.3[L]      10 Apr 2025 05:25  Phos  2.0     04-10  Mg     2.2     04-10    TPro  4.5[L]  /  Alb  2.3[L]  /  TBili  0.4  /  DBili  <0.1  /  AST  11  /  ALT  19  /  AlkPhos  21[L]  04-09    PT/INR - ( 09 Apr 2025 06:25 )   PT: 15.0 sec;   INR: 1.29 ratio         PTT - ( 09 Apr 2025 06:25 )  PTT:28.3 sec  CAPILLARY BLOOD GLUCOSE            Urinalysis Basic - ( 10 Apr 2025 05:25 )    Color: x / Appearance: x / SG: x / pH: x  Gluc: 131 mg/dL / Ketone: x  / Bili: x / Urobili: x   Blood: x / Protein: x / Nitrite: x   Leuk Esterase: x / RBC: x / WBC x   Sq Epi: x / Non Sq Epi: x / Bacteria: x        RADIOLOGY & ADDITIONAL TESTS:    Imaging Personally Reviewed:  [ ] YES  [ ] NO   Patient is a 69y old  Male who presents with a chief complaint of Dark stool (10 Apr 2025 10:26)    OVERNIGHT EVENTS: no acute changes.     Pt is aox3, comfortable appearing, tolerating PO, ambulatory.     REVIEW OF SYSTEMS: (I am native cantonese speaking, no  was needed)  CONSTITUTIONAL: No fever, chills  ENMT:  +swelling on face. No difficulty hearing, no change in vision  NECK: No pain or stiffness  RESPIRATORY: No cough, SOB  CARDIOVASCULAR: No chest pain, palpitations  GASTROINTESTINAL: No abdominal pain. No nausea, vomiting, or diarrhea  GENITOURINARY: No dysuria  NEUROLOGICAL: No HA  SKIN: No itching, burning, rashes, or lesions   LYMPH NODES: No enlarged glands  ENDOCRINE: No heat or cold intolerance; No hair loss  MUSCULOSKELETAL: +swelling on hands.  No muscle, back, or extremity pain  PSYCHIATRIC: No depression, anxiety  HEME/LYMPH: No easy bruising, or bleeding gums    T(C): 36.5 (04-10-25 @ 13:40), Max: 36.8 (04-09-25 @ 14:15)  HR: 99 (04-10-25 @ 13:40) (91 - 103)  BP: 109/70 (04-10-25 @ 13:40) (97/71 - 121/82)  RR: 16 (04-10-25 @ 13:40) (10 - 21)  SpO2: 99% (04-10-25 @ 13:40) (95% - 100%)  Wt(kg): --Vital Signs Last 24 Hrs  T(C): 36.5 (10 Apr 2025 13:40), Max: 36.8 (09 Apr 2025 14:15)  T(F): 97.7 (10 Apr 2025 13:40), Max: 98.3 (09 Apr 2025 15:30)  HR: 99 (10 Apr 2025 13:40) (91 - 103)  BP: 109/70 (10 Apr 2025 13:40) (97/71 - 121/82)  BP(mean): 76 (09 Apr 2025 21:14) (76 - 76)  RR: 16 (10 Apr 2025 13:40) (10 - 21)  SpO2: 99% (10 Apr 2025 13:40) (95% - 100%)    Parameters below as of 10 Apr 2025 13:40  Patient On (Oxygen Delivery Method): room air        MEDICATIONS  (STANDING):  atorvastatin 20 milliGRAM(s) Oral at bedtime  lactated ringers. 1000 milliLiter(s) (80 mL/Hr) IV Continuous <Continuous>  pantoprazole  Injectable 40 milliGRAM(s) IV Push every 12 hours    MEDICATIONS  (PRN):  acetaminophen     Tablet .. 650 milliGRAM(s) Oral every 6 hours PRN Temp greater or equal to 38C (100.4F), Mild Pain (1 - 3)  ondansetron Injectable 4 milliGRAM(s) IV Push every 8 hours PRN Nausea and/or Vomiting      PHYSICAL EXAM:  GENERAL: +obese.  NAD  EYES: clear conjunctiva  ENMT: Moist mucous membranes  NECK: Supple, No JVD, Normal thyroid  CHEST/LUNG: Clear to auscultation bilaterally; No rales, rhonchi, wheezing, or rubs  HEART: S1, S2, Regular rate and rhythm  ABDOMEN: Soft, Nontender, Nondistended; Bowel sounds present  NEURO: Alert & Oriented X3  EXTREMITIES: No LE edema, no calf tenderness  LYMPH: No lymphadenopathy noted  SKIN: No rashes or lesions    Consultant(s) Notes Reviewed:  [x ] YES  [ ] NO  Care Discussed with Consultants/Other Providers [ x] YES  [ ] NO    LABS:                        7.3    9.99  )-----------( 146      ( 10 Apr 2025 05:25 )             21.9     04-10    141  |  110[H]  |  26[H]  ----------------------------<  131[H]  3.6   |  28  |  0.87    Ca    7.3[L]      10 Apr 2025 05:25  Phos  2.0     04-10  Mg     2.2     04-10    TPro  4.5[L]  /  Alb  2.3[L]  /  TBili  0.4  /  DBili  <0.1  /  AST  11  /  ALT  19  /  AlkPhos  21[L]  04-09    PT/INR - ( 09 Apr 2025 06:25 )   PT: 15.0 sec;   INR: 1.29 ratio         PTT - ( 09 Apr 2025 06:25 )  PTT:28.3 sec  CAPILLARY BLOOD GLUCOSE            Urinalysis Basic - ( 10 Apr 2025 05:25 )    Color: x / Appearance: x / SG: x / pH: x  Gluc: 131 mg/dL / Ketone: x  / Bili: x / Urobili: x   Blood: x / Protein: x / Nitrite: x   Leuk Esterase: x / RBC: x / WBC x   Sq Epi: x / Non Sq Epi: x / Bacteria: x        RADIOLOGY & ADDITIONAL TESTS:  < from: CT Abdomen and Pelvis w/wo IV Cont (04.07.25 @ 18:42) >    ACC: [id number] EXAM:  CT ABDOMEN AND PELVIS WAW IC   ORDERED BY: ELI PEDROZA     PROCEDURE DATE:  04/07/2025          INTERPRETATION:  CLINICAL INFORMATION: Dark tarry stools. Mid abdominal   pain and vomiting.    ADDITIONAL CLINICAL INFORMATION: Other, Non-specified    COMPARISON: None.    CONTRAST/COMPLICATIONS:  IV Contrast: Omnipaque 350  90 cc administered   10 cc discarded  Oral Contrast: NONE  .    PROCEDURE:  CT of the Abdomen and Pelvis was performed.  Precontrast, Arterial and Delayed phases were performed.  Sagittal and coronal reformats were performed.    FINDINGS:  LOWER CHEST: Within normal limits.    LIVER: Indeterminate enhancing 8 mm focus in the left hepatic lobe   (series 10, image 29).  BILE DUCTS: Normal caliber.  GALLBLADDER: Within normal limits.  SPLEEN: Within normal limits.  PANCREAS: Well circumscribed hyperenhancing mass in the pancreatic head   measuring 1.5 x 1.1 x 1.6 cm (series 10, image 52; series 12, image 56).  ADRENALS: 8 mm left adrenal adenoma.Right adrenal gland is unremarkable.  KIDNEYS/URETERS: No hydronephrosis or obstructive renal calculi.   Subcentimeter hypodensity in the right kidney is too small to   characterize but likely represents a cyst.    BLADDER: Within normal limits.  REPRODUCTIVE ORGANS: Prostate within normal limits.    BOWEL: No bowel obstruction. The appendix is unremarkable. No findings to   suggest active GI bleeding.  PERITONEUM/RETROPERITONEUM: Within normal limits.  VESSELS: Atherosclerotic changes.  LYMPH NODES: No lymphadenopathy.  ABDOMINAL WALL: Within normal limits.  BONES: Degenerative changes.    IMPRESSION:    1.  No CT findings to suggest active GI bleeding. No bowel obstruction or   inflammation.    2.  Incidental 1.6 cm circumscribed enhancing mass in the pancreatic   head, suspicious for a neuroendocrine tumor. Additional indeterminate 8   mm enhancing focus in the left hepatic lobe; metastasis is not excluded.   Recommend further evaluation with MRI with and without contrast (liver   protocol).          --- End of Report ---             RODGER DAIGLE MD; Attending Radiologist  This document has been electronically signed. Apr 7 2025  7:03PM    < end of copied text >      Imaging Personally Reviewed:  [ ] YES  [ ] NO

## 2025-04-10 NOTE — PROGRESS NOTE ADULT - PROBLEM SELECTOR PLAN 1
CT noted above  F/u MRI  GI following  Recommendations appreciated CT concern for 1.6 cm pancreatic mass possible neuroendocrine tumor  F/u MRI A/P   send tumor marker Ca 19-9, , CEA

## 2025-04-10 NOTE — PROGRESS NOTE ADULT - SUBJECTIVE AND OBJECTIVE BOX
GI Progress Note    Patient is a 69y old  Male who presents with a chief complaint of Dark stool (09 Apr 2025 15:43)    GI was consulted for Melena.    24-HOUR INTERVAL EVENTS: Patient seen and examined on unit. Patient resting in bed. s/p EGD. Tolerating meals. No n/v/D.      MEDICATIONS  (STANDING):  atorvastatin 20 milliGRAM(s) Oral at bedtime  lactated ringers. 1000 milliLiter(s) (80 mL/Hr) IV Continuous <Continuous>  pantoprazole  Injectable 40 milliGRAM(s) IV Push every 12 hours    MEDICATIONS  (PRN):  acetaminophen     Tablet .. 650 milliGRAM(s) Oral every 6 hours PRN Temp greater or equal to 38C (100.4F), Mild Pain (1 - 3)  ondansetron Injectable 4 milliGRAM(s) IV Push every 8 hours PRN Nausea and/or Vomiting    __________________________________________________  REVIEW OF SYSTEMS:  A detailed set of ROS were asked and negative except those outlined in GI HPI above/below.   ________________________________________________  PHYSICAL EXAM    Vital Signs Last 24 Hrs  T(C): 36.8 (10 Apr 2025 05:55), Max: 36.8 (09 Apr 2025 14:15)  T(F): 98.2 (10 Apr 2025 05:55), Max: 98.3 (09 Apr 2025 15:30)  HR: 91 (10 Apr 2025 05:55) (91 - 103)  BP: 103/60 (10 Apr 2025 05:55) (96/60 - 121/82)  BP(mean): 76 (09 Apr 2025 21:14) (76 - 76)  RR: 16 (10 Apr 2025 05:55) (10 - 21)  SpO2: 96% (10 Apr 2025 05:55) (95% - 100%)    Parameters below as of 10 Apr 2025 05:55  Patient On (Oxygen Delivery Method): room air        GEN: NAD  PULM: clear  CV: HR normal  GI: soft, NT, ND; +BS in all four quadrants  MSK: no edema  NEURO: A&O x 3  _________________________________________________  LABS:                        7.3    9.99  )-----------( 146      ( 10 Apr 2025 05:25 )             21.9     04-10    141  |  110[H]  |  26[H]  ----------------------------<  131[H]  3.6   |  28  |  0.87    Ca    7.3[L]      10 Apr 2025 05:25  Phos  2.0     04-10  Mg     2.2     04-10    TPro  4.5[L]  /  Alb  2.3[L]  /  TBili  0.4  /  DBili  <0.1  /  AST  11  /  ALT  19  /  AlkPhos  21[L]  04-09    PT/INR - ( 09 Apr 2025 06:25 )   PT: 15.0 sec;   INR: 1.29 ratio         PTT - ( 09 Apr 2025 06:25 )  PTT:28.3 sec  Urinalysis Basic - ( 10 Apr 2025 05:25 )    Color: x / Appearance: x / SG: x / pH: x  Gluc: 131 mg/dL / Ketone: x  / Bili: x / Urobili: x   Blood: x / Protein: x / Nitrite: x   Leuk Esterase: x / RBC: x / WBC x   Sq Epi: x / Non Sq Epi: x / Bacteria: x      CAPILLARY BLOOD GLUCOSE      POCT Blood Glucose.: 131 mg/dL (09 Apr 2025 12:18)        RADIOLOGY & ADDITIONAL TESTS:  < from: EGD (04.09.25 @ 15:00) >  Impressions:        Esophagus: non-obstructive [place]. 2 cm HH. .        Stomach: no evidence of bleeding. Normal mucosa throughout.         Duodenum: normal mucosa. No bleeding.             This note and its recommendations herein are preliminary until such time as cosigned by an attending.

## 2025-04-10 NOTE — PROGRESS NOTE ADULT - NS ATTEND AMEND GEN_ALL_CORE FT
69 year old man with hx of HTN with no prior visit to this facility who presents with weakness, dizziness preceded by  few days of dark stools, nausea and dark colored emesis as well but no abdominal pain. Admit for UGIB and pancreatic mass    #Acute symptomatic anemia 2/2 GIB  #Pancreatic mass  #Melena  #HTN  - Patient with continued melena and downtrending hgb requiring 3U pRBC   - EGD neg  - MR with IV contrast to assess pancreas and liver  - f/u GI recs - rec colonoscopy, will plan for golitely once scheduled  - c/w PPI  - Trend Hgb - downtrending - monitor Q8h   - Melena this AM
69y old  Male PMH of DM, HLD, former smoker who presents with a chief complaint of Dark stool. GI consulted for Melena  EGD no findings but Hb dropped further today  Will plan colon tomorrow or Mon based on prep results and endo availability
69 year old man with hx of HTN with no prior visit to this facility who presents with weakness, dizziness preceded by  few days of dark stools, nausea and dark colored emesis as well but no abdominal pain. Admit for UGIB and pancreatic mass    #Acute symptomatic anemia 2/2 GIB  #Pancreatic mass  #Melena  #HTN  - Patient with continued melena and downtrending hgb requiring 3U pRBC overnight   - EGD with EUS today  - MR with IV contrast to assess pancreas and liver  - f/u GI recs  - c/w PPI  - Trend Hgb

## 2025-04-10 NOTE — PROGRESS NOTE ADULT - ASSESSMENT
69y old  Male PMH of DM, HLD, former smoker who presents with a chief complaint of Dark stool. GI consulted for Melena    PLAN:  #Melena  r/o esophagitis  r/o PUD  r/o malignancy    -s/p EGD noted above, no evidence of active bleed  - Diet as tolerated  - Maintain active T&S, 2 large bore peripheral IVs, transfuse for goal Hgb >7 or if symptomatic  - Trend H/H  -s/p 3PRBCs with inappropriate response  - BUN noted, elevated  - IV Protonix 40mg BID  - Fluid resuscitation/HD stability per primary team  - Avoid NSAIDs and hold anticoagulation (if safe per primary team  -Patient is due and would benefit for colonoscopy this admission to complete workup    This note and its recommendations herein are preliminary until such time as cosigned by an attending

## 2025-04-10 NOTE — PROGRESS NOTE ADULT - PROBLEM SELECTOR PLAN 2
CT A/P noted for 8 mm left hepatic lesion  f/u MRI a/p to further evaluate for malignancy CT A/P noted for 8 mm left hepatic lesion  send tumor marker - AFP  f/u MRI a/p to further evaluate for malignancy

## 2025-04-10 NOTE — PROGRESS NOTE ADULT - PROBLEM SELECTOR PLAN 3
CT noted above  s/p EGD - negative   continue IV Protonix 40mg BID   Trend CBC  GI following, pending possible colonoscopy

## 2025-04-11 ENCOUNTER — TRANSCRIPTION ENCOUNTER (OUTPATIENT)
Age: 70
End: 2025-04-11

## 2025-04-11 DIAGNOSIS — D62 ACUTE POSTHEMORRHAGIC ANEMIA: ICD-10-CM

## 2025-04-11 LAB
AFP-TM SERPL-MCNC: 2.2 NG/ML — SIGNIFICANT CHANGE UP
ANION GAP SERPL CALC-SCNC: 4 MMOL/L — LOW (ref 5–17)
BILIRUB SERPL-MCNC: 0.5 MG/DL — SIGNIFICANT CHANGE UP (ref 0.2–1.2)
BUN SERPL-MCNC: 15 MG/DL — SIGNIFICANT CHANGE UP (ref 7–18)
CALCIUM SERPL-MCNC: 7.5 MG/DL — LOW (ref 8.4–10.5)
CANCER AG125 SERPL-ACNC: 5 U/ML — SIGNIFICANT CHANGE UP
CANCER AG19-9 SERPL-ACNC: 9 U/ML — SIGNIFICANT CHANGE UP
CEA SERPL-MCNC: 0.9 NG/ML — SIGNIFICANT CHANGE UP (ref 0–3.8)
CHLORIDE SERPL-SCNC: 109 MMOL/L — HIGH (ref 96–108)
CO2 SERPL-SCNC: 28 MMOL/L — SIGNIFICANT CHANGE UP (ref 22–31)
CREAT SERPL-MCNC: 0.93 MG/DL — SIGNIFICANT CHANGE UP (ref 0.5–1.3)
EGFR: 88 ML/MIN/1.73M2 — SIGNIFICANT CHANGE UP
EGFR: 88 ML/MIN/1.73M2 — SIGNIFICANT CHANGE UP
GLUCOSE BLDC GLUCOMTR-MCNC: 122 MG/DL — HIGH (ref 70–99)
GLUCOSE BLDC GLUCOMTR-MCNC: 82 MG/DL — SIGNIFICANT CHANGE UP (ref 70–99)
GLUCOSE SERPL-MCNC: 127 MG/DL — HIGH (ref 70–99)
HCT VFR BLD CALC: 21.7 % — LOW (ref 39–50)
HCT VFR BLD CALC: 22.5 % — LOW (ref 39–50)
HGB BLD-MCNC: 7.2 G/DL — LOW (ref 13–17)
HGB BLD-MCNC: 7.4 G/DL — LOW (ref 13–17)
INR BLD: 1.1 RATIO — SIGNIFICANT CHANGE UP (ref 0.85–1.16)
MCHC RBC-ENTMCNC: 29.1 PG — SIGNIFICANT CHANGE UP (ref 27–34)
MCHC RBC-ENTMCNC: 29.2 PG — SIGNIFICANT CHANGE UP (ref 27–34)
MCHC RBC-ENTMCNC: 32.9 G/DL — SIGNIFICANT CHANGE UP (ref 32–36)
MCHC RBC-ENTMCNC: 33.2 G/DL — SIGNIFICANT CHANGE UP (ref 32–36)
MCV RBC AUTO: 87.9 FL — SIGNIFICANT CHANGE UP (ref 80–100)
MCV RBC AUTO: 88.9 FL — SIGNIFICANT CHANGE UP (ref 80–100)
MELD SCORE WITH DIALYSIS: 21 POINTS — SIGNIFICANT CHANGE UP
MELD SCORE WITHOUT DIALYSIS: 7 POINTS — SIGNIFICANT CHANGE UP
NRBC BLD AUTO-RTO: 0 /100 WBCS — SIGNIFICANT CHANGE UP (ref 0–0)
NRBC BLD AUTO-RTO: 0 /100 WBCS — SIGNIFICANT CHANGE UP (ref 0–0)
PHOSPHATE SERPL-MCNC: 2.4 MG/DL — LOW (ref 2.5–4.5)
PLATELET # BLD AUTO: 166 K/UL — SIGNIFICANT CHANGE UP (ref 150–400)
PLATELET # BLD AUTO: 191 K/UL — SIGNIFICANT CHANGE UP (ref 150–400)
POTASSIUM SERPL-MCNC: 3.4 MMOL/L — LOW (ref 3.5–5.3)
POTASSIUM SERPL-SCNC: 3.4 MMOL/L — LOW (ref 3.5–5.3)
PROTHROM AB SERPL-ACNC: 12.8 SEC — SIGNIFICANT CHANGE UP (ref 9.9–13.4)
RBC # BLD: 2.47 M/UL — LOW (ref 4.2–5.8)
RBC # BLD: 2.53 M/UL — LOW (ref 4.2–5.8)
RBC # FLD: 17.2 % — HIGH (ref 10.3–14.5)
RBC # FLD: 17.8 % — HIGH (ref 10.3–14.5)
SODIUM SERPL-SCNC: 141 MMOL/L — SIGNIFICANT CHANGE UP (ref 135–145)
WBC # BLD: 7.63 K/UL — SIGNIFICANT CHANGE UP (ref 3.8–10.5)
WBC # BLD: 8.76 K/UL — SIGNIFICANT CHANGE UP (ref 3.8–10.5)
WBC # FLD AUTO: 7.63 K/UL — SIGNIFICANT CHANGE UP (ref 3.8–10.5)
WBC # FLD AUTO: 8.76 K/UL — SIGNIFICANT CHANGE UP (ref 3.8–10.5)

## 2025-04-11 PROCEDURE — 45380 COLONOSCOPY AND BIOPSY: CPT

## 2025-04-11 PROCEDURE — 74183 MRI ABD W/O CNTR FLWD CNTR: CPT | Mod: 26

## 2025-04-11 PROCEDURE — 99233 SBSQ HOSP IP/OBS HIGH 50: CPT | Mod: FS

## 2025-04-11 PROCEDURE — 88305 TISSUE EXAM BY PATHOLOGIST: CPT | Mod: 26

## 2025-04-11 RX ORDER — SODIUM CHLORIDE 9 G/1000ML
1000 INJECTION, SOLUTION INTRAVENOUS
Refills: 0 | Status: DISCONTINUED | OUTPATIENT
Start: 2025-04-11 | End: 2025-04-13

## 2025-04-11 RX ADMIN — ATORVASTATIN CALCIUM 20 MILLIGRAM(S): 80 TABLET, FILM COATED ORAL at 21:12

## 2025-04-11 RX ADMIN — Medication 40 MILLIGRAM(S): at 05:57

## 2025-04-11 RX ADMIN — SODIUM CHLORIDE 70 MILLILITER(S): 9 INJECTION, SOLUTION INTRAVENOUS at 17:04

## 2025-04-11 RX ADMIN — Medication 40 MILLIGRAM(S): at 17:04

## 2025-04-11 RX ADMIN — SODIUM CHLORIDE 70 MILLILITER(S): 9 INJECTION, SOLUTION INTRAVENOUS at 10:31

## 2025-04-11 NOTE — PROGRESS NOTE ADULT - PROBLEM SELECTOR PLAN 6
Hold chemical VTE prophylaxis in the setting of UGIB  DVT PPX: SCDs  GI PPX: PPI
Hold chemical VTE prophylaxis in the setting of UGIB  DVT PPX: SCDs  GI PPX: PPI
Takes Metformin at home  a1c 6.1%  glucose controlled for now
Takes Metformin at home  a1c 6.1%  glucose controlled for now

## 2025-04-11 NOTE — PROGRESS NOTE ADULT - PROBLEM SELECTOR PLAN 3
CT noted above  s/p EGD - negative for bleeding  continue IV Protonix 40mg BID   Trend CBC  GI following, pending colonoscopy 4/11 CT A/P noted for 8 mm left hepatic lesion  MRI A/P - Few punctate hepatic cysts. Normal hepatic morphology. Previously   noted left hepatic lobe lesion on prior CT is not conspicuous on current   exam secondary to motion artifact. No additional enhancing mass or signal   abnormality.    f/u tumor marker - AFP

## 2025-04-11 NOTE — PROGRESS NOTE ADULT - PROBLEM SELECTOR PLAN 1
CT concern for 1.6 cm pancreatic mass possible neuroendocrine tumor  MRI showing peripherally enhancing 1.2 cm lesion in the pancreatic head  f/u tumor marker Ca 19-9, , CEA  GI re-consulted for possible EUS, biopsy of pancreatic lesion CT concern for 1.6 cm pancreatic mass possible neuroendocrine tumor  MRI showing peripherally enhancing 1.2 cm lesion in the pancreatic head  f/u tumor marker Ca 19-9, , CEA  GI re-consulted for possible EUS, biopsy of pancreatic lesion, next week

## 2025-04-11 NOTE — PROGRESS NOTE ADULT - ATTENDING COMMENTS
69 year old man with hx of HTN with no prior visit to this facility who presents with weakness, dizziness preceded by  few days of dark stools, nausea and dark colored emesis as well but no abdominal pain. Admit for UGIB and pancreatic mass    #Acute symptomatic anemia 2/2 GIB  #Pancreatic mass  #Melena  #HTN  - Patient with continued melena and downtrending hgb requiring 3U pRBC   - EGD neg    - Colonoscopy today  - MR with IV contrast to assess pancreas and liver completed  - f/u GI recs - recs apreciated  - c/w PPI  - Trend Hgb - stable

## 2025-04-11 NOTE — PROGRESS NOTE ADULT - PROBLEM SELECTOR PLAN 2
CT A/P noted for 8 mm left hepatic lesion  MRI A/P - Few punctate hepatic cysts. Normal hepatic morphology. Previously   noted left hepatic lobe lesion on prior CT is not conspicuous on current   exam secondary to motion artifact. No additional enhancing mass or signal   abnormality.    f/u tumor marker - AFP pt c/o dark colored stools   now with low hemoglobin 6.0, s/p 3 units prbc  hgb today 7.2/7.4  CT noted above  s/p EGD and colonoscopy - negative for bleeding  dc protonix  tolerating reg diet   Trend CBC pt c/o dark colored stools   now with low hemoglobin 6.0, s/p 3 units prbc  hgb today 7.2/7.4  CT noted above  s/p EGD and colonoscopy - negative for bleeding  dc protonix  tolerating reg diet   Trend CBC  repeat type and screen 4/12

## 2025-04-11 NOTE — PROGRESS NOTE ADULT - SUBJECTIVE AND OBJECTIVE BOX
Patient is a 69y old  Male who presents with a chief complaint of Dark stool (10 Apr 2025 10:26)    OVERNIGHT EVENTS: no acute changes.     Pt is aox3, comfortable appearing, current NPO for Endoscopy.   Pt reports that he still has small amounts of melena today.   Pt is ambulatory.     REVIEW OF SYSTEMS:  CONSTITUTIONAL: No fever, chills  ENMT:  No difficulty hearing, no change in vision  NECK: No pain or stiffness  RESPIRATORY: No cough, SOB  CARDIOVASCULAR: No chest pain, palpitations  GASTROINTESTINAL: No abdominal pain. No nausea, vomiting, or diarrhea  GENITOURINARY: No dysuria  NEUROLOGICAL: No HA  SKIN: +swelling in hands, legs, eyes. No itching, burning, rashes, or lesions   LYMPH NODES: No enlarged glands  ENDOCRINE: No heat or cold intolerance; No hair loss  MUSCULOSKELETAL: No joint pain or swelling; No muscle, back, or extremity pain  PSYCHIATRIC: No depression, anxiety  HEME/LYMPH: No easy bruising, or bleeding gums    T(C): 36.9 (04-11-25 @ 12:40), Max: 37.2 (04-10-25 @ 20:58)  HR: 93 (04-11-25 @ 12:40) (90 - 99)  BP: 100/63 (04-11-25 @ 12:40) (100/63 - 111/74)  RR: 17 (04-11-25 @ 12:40) (16 - 18)  SpO2: 99% (04-11-25 @ 12:40) (96% - 99%)  Wt(kg): --Vital Signs Last 24 Hrs  T(C): 36.9 (11 Apr 2025 12:40), Max: 37.2 (10 Apr 2025 20:58)  T(F): 98.5 (11 Apr 2025 12:40), Max: 98.9 (10 Apr 2025 20:58)  HR: 93 (11 Apr 2025 12:40) (90 - 99)  BP: 100/63 (11 Apr 2025 12:40) (100/63 - 111/74)  BP(mean): 75 (11 Apr 2025 12:40) (75 - 80)  RR: 17 (11 Apr 2025 12:40) (16 - 18)  SpO2: 99% (11 Apr 2025 12:40) (96% - 99%)    Parameters below as of 11 Apr 2025 12:40  Patient On (Oxygen Delivery Method): room air        MEDICATIONS  (STANDING):  atorvastatin 20 milliGRAM(s) Oral at bedtime  lactated ringers 1000 milliLiter(s) (70 mL/Hr) IV Continuous <Continuous>  pantoprazole  Injectable 40 milliGRAM(s) IV Push every 12 hours    MEDICATIONS  (PRN):  acetaminophen     Tablet .. 650 milliGRAM(s) Oral every 6 hours PRN Temp greater or equal to 38C (100.4F), Mild Pain (1 - 3)  ondansetron Injectable 4 milliGRAM(s) IV Push every 8 hours PRN Nausea and/or Vomiting      PHYSICAL EXAM:  GENERAL: NAD +obese.   EYES: clear conjunctiva  ENMT: Moist mucous membranes  NECK: Supple, No JVD, Normal thyroid  CHEST/LUNG: Clear to auscultation bilaterally; No rales, rhonchi, wheezing, or rubs  HEART: S1, S2, Regular rate and rhythm  ABDOMEN: Soft, Nontender, Nondistended; Bowel sounds present  NEURO: Alert & Oriented X3  EXTREMITIES: No LE edema, no calf tenderness  LYMPH: No lymphadenopathy noted  SKIN: +mild anasarca. No rashes or lesions    Consultant(s) Notes Reviewed:  [x ] YES  [ ] NO  Care Discussed with Consultants/Other Providers [ x] YES  [ ] NO    LABS:                        7.2    7.63  )-----------( 166      ( 11 Apr 2025 05:15 )             21.7     04-11    141  |  109[H]  |  15  ----------------------------<  127[H]  3.4[L]   |  28  |  0.93    Ca    7.5[L]      11 Apr 2025 05:15  Phos  2.4     04-11  Mg     2.2     04-10    TPro  x   /  Alb  x   /  TBili  0.5  /  DBili  x   /  AST  x   /  ALT  x   /  AlkPhos  x   04-11    PT/INR - ( 11 Apr 2025 05:15 )   PT: 12.8 sec;   INR: 1.10 ratio           CAPILLARY BLOOD GLUCOSE      POCT Blood Glucose.: 82 mg/dL (11 Apr 2025 11:41)  POCT Blood Glucose.: 122 mg/dL (11 Apr 2025 07:46)        Urinalysis Basic - ( 11 Apr 2025 05:15 )    Color: x / Appearance: x / SG: x / pH: x  Gluc: 127 mg/dL / Ketone: x  / Bili: x / Urobili: x   Blood: x / Protein: x / Nitrite: x   Leuk Esterase: x / RBC: x / WBC x   Sq Epi: x / Non Sq Epi: x / Bacteria: x        RADIOLOGY & ADDITIONAL TESTS:  < from: MR Abdomen w/wo IV Cont (04.11.25 @ 09:04) >    ACC: [id number] EXAM:  MR ABDOMEN WAW IC   ORDERED BY: AYAKA GONZALES     PROCEDURE DATE:  04/11/2025          INTERPRETATION:  CLINICAL INFORMATION: Pancreatic and hepatic lesions.    COMPARISON: CT abdomen pelvis 4/7/2025    CONTRAST/COMPLICATIONS:  IV Contrast: Administered.  Oral Contrast: None.    PROCEDURE:  MRI of the abdomen was performed.  MRCP was performed.    FINDINGS:  LOWER CHEST: Within normal limits.    LIVER: Few punctate hepatic cysts. Normal hepatic morphology. Previously   noted left hepatic lobe lesion on prior CT is not conspicuous on current   exam secondary to motion artifact. No additional enhancing mass or signal   abnormality.  BILE DUCTS: Normal caliber.  GALLBLADDER: Within normal limits.  SPLEEN: Within normal limits.  PANCREAS: A peripherally enhancing 1.2 cm lesion in the pancreatic head   (14/59).  No main ductal dilatation.  ADRENALS: Unremarkable.  KIDNEYS/URETERS: Bilateral renal cysts. No hydronephrosis.    VISUALIZED PORTIONS:  BOWEL: No obstruction. Appendix is normal.  PERITONEUM: No ascites.  VESSELS: Atherosclerotic changes.  RETROPERITONEUM/LYMPH NODES: No lymphadenopathy.  ABDOMINAL WALL: Within normal limits.  BONES: Degenerative changes.    IMPRESSION:  Previously noted left hepatic lobe lesion on prior CT is not conspicuous   on current exam secondary to motion artifact. No additional enhancing   mass or signal abnormality.  A peripherally enhancing 1.2 cm lesion in the pancreatic head. No main   ductal dilatation. Recommend GI consult.    --- End of Report ---          PAO VAZQUEZ MD; Resident Radiologist  This document has been electronically signed.  VIKI COLVIN MD; Attending Radiologist  This document has been electronically signed. Apr 11 2025 10:55AM    < end of copied text >      Imaging Personally Reviewed:  [ ] YES  [ ] NO

## 2025-04-11 NOTE — PROGRESS NOTE ADULT - PROBLEM SELECTOR PLAN 4
Continue with home dose statin
Continue with home dose statin
Resolved  Continue IVF  Encourage PO intake  Trend Lactate
Resolved  Continue IVF  Encourage PO intake  Trend Lactate

## 2025-04-11 NOTE — PROGRESS NOTE ADULT - ASSESSMENT
68 y/o M, from home, Cantonese speaking, Ex smoker ( Quit 10 years ago, 30pk years) with PMH of HLD, DM present with 3 days history of well formed  dark stool, generalized weakness, dizziness ( room spinning sensation) and nausea that worsened today. Admitted to medicine for further evaluation of GI bleed.     CT A/P - concern for 1.6 cm pancreatic mass possible neuroendocrine tumor, also 8 mm hepatic lesion.   EGD - negative for bleeding.   further eval with MRI abd pelvis shows peripherally enhancing 1.2 cm lesion in the pancreatic head.   Discussed with GI for possible EUS, biopsy of pancreatic lesion.   Pt is planned for colonoscopy today. Hemoglobin remains low 7.3, pt is asymptomatic, will continue to trend.  68 y/o M, from home, Cantonese speaking, Ex smoker ( Quit 10 years ago, 30pk years) with PMH of HLD, DM present with 3 days history of well formed  dark stool, generalized weakness, dizziness ( room spinning sensation) and nausea that worsened today. Admitted to medicine for further evaluation of GI bleed.     CT A/P - concern for 1.6 cm pancreatic mass possible neuroendocrine tumor, also 8 mm hepatic lesion.   EGD - negative for bleeding.   further eval with MRI abd pelvis shows peripherally enhancing 1.2 cm lesion in the pancreatic head.     Now s/p colonoscopy today, no acute findings of bleeding. Hemoglobin remains low 7.2/7.4.  Discussed with GI for possible EUS, biopsy of pancreatic lesion next week.

## 2025-04-11 NOTE — PROGRESS NOTE ADULT - PROBLEM SELECTOR PLAN 5
Continue with home dose statin
Takes Metformin at home  Pt to be NPOpMN  F/u to start insulin regimen with diet  F/u A1c  Finger sticks
Takes Metformin at home  Pt to be NPOpMN  F/u to start insulin regimen with diet  F/u A1c  Finger sticks
Continue with home dose statin

## 2025-04-12 LAB
ANION GAP SERPL CALC-SCNC: 3 MMOL/L — LOW (ref 5–17)
BLD GP AB SCN SERPL QL: SIGNIFICANT CHANGE UP
BUN SERPL-MCNC: 12 MG/DL — SIGNIFICANT CHANGE UP (ref 7–18)
CALCIUM SERPL-MCNC: 8 MG/DL — LOW (ref 8.4–10.5)
CHLORIDE SERPL-SCNC: 110 MMOL/L — HIGH (ref 96–108)
CO2 SERPL-SCNC: 28 MMOL/L — SIGNIFICANT CHANGE UP (ref 22–31)
CREAT SERPL-MCNC: 0.88 MG/DL — SIGNIFICANT CHANGE UP (ref 0.5–1.3)
EGFR: 92 ML/MIN/1.73M2 — SIGNIFICANT CHANGE UP
EGFR: 92 ML/MIN/1.73M2 — SIGNIFICANT CHANGE UP
GLUCOSE SERPL-MCNC: 104 MG/DL — HIGH (ref 70–99)
HCT VFR BLD CALC: 22.1 % — LOW (ref 39–50)
HGB BLD-MCNC: 7.3 G/DL — LOW (ref 13–17)
MAGNESIUM SERPL-MCNC: 2.4 MG/DL — SIGNIFICANT CHANGE UP (ref 1.6–2.6)
MCHC RBC-ENTMCNC: 29.2 PG — SIGNIFICANT CHANGE UP (ref 27–34)
MCHC RBC-ENTMCNC: 33 G/DL — SIGNIFICANT CHANGE UP (ref 32–36)
MCV RBC AUTO: 88.4 FL — SIGNIFICANT CHANGE UP (ref 80–100)
NRBC BLD AUTO-RTO: 0 /100 WBCS — SIGNIFICANT CHANGE UP (ref 0–0)
PLATELET # BLD AUTO: 178 K/UL — SIGNIFICANT CHANGE UP (ref 150–400)
POTASSIUM SERPL-MCNC: 3.8 MMOL/L — SIGNIFICANT CHANGE UP (ref 3.5–5.3)
POTASSIUM SERPL-SCNC: 3.8 MMOL/L — SIGNIFICANT CHANGE UP (ref 3.5–5.3)
RBC # BLD: 2.5 M/UL — LOW (ref 4.2–5.8)
RBC # FLD: 17.6 % — HIGH (ref 10.3–14.5)
SODIUM SERPL-SCNC: 141 MMOL/L — SIGNIFICANT CHANGE UP (ref 135–145)
WBC # BLD: 7.74 K/UL — SIGNIFICANT CHANGE UP (ref 3.8–10.5)
WBC # FLD AUTO: 7.74 K/UL — SIGNIFICANT CHANGE UP (ref 3.8–10.5)

## 2025-04-12 PROCEDURE — 99232 SBSQ HOSP IP/OBS MODERATE 35: CPT

## 2025-04-12 RX ADMIN — ATORVASTATIN CALCIUM 20 MILLIGRAM(S): 80 TABLET, FILM COATED ORAL at 21:07

## 2025-04-12 NOTE — PROGRESS NOTE ADULT - SUBJECTIVE AND OBJECTIVE BOX
[facility] Medicine  Patient is a 69y old  Male who presents with a chief complaint of Dark stool (11 Apr 2025 13:37)      SUBJECTIVE / OVERNIGHT EVENTS:  No acute events over night. Denies any fevers/chills, headache, CP, SOB, abd pain, N/V/D, constipation, or leg swelling.       MEDICATIONS  (STANDING):  atorvastatin 20 milliGRAM(s) Oral at bedtime  lactated ringers 1000 milliLiter(s) (70 mL/Hr) IV Continuous <Continuous>    MEDICATIONS  (PRN):  acetaminophen     Tablet .. 650 milliGRAM(s) Oral every 6 hours PRN Temp greater or equal to 38C (100.4F), Mild Pain (1 - 3)  ondansetron Injectable 4 milliGRAM(s) IV Push every 8 hours PRN Nausea and/or Vomiting        OBJECTIVE:  Vital Signs Last 24 Hrs  T(C): 36.2 (12 Apr 2025 05:28), Max: 36.8 (11 Apr 2025 20:38)  T(F): 97.1 (12 Apr 2025 05:28), Max: 98.2 (11 Apr 2025 20:38)  HR: 89 (12 Apr 2025 05:28) (89 - 90)  BP: 111/69 (12 Apr 2025 05:28) (104/66 - 111/69)  BP(mean): --  RR: 16 (12 Apr 2025 05:28) (16 - 17)  SpO2: 96% (12 Apr 2025 05:28) (96% - 97%)    Parameters below as of 12 Apr 2025 05:28  Patient On (Oxygen Delivery Method): room air      PHYSICAL EXAM:  GENERAL: NAD,   HEAD:  Atraumatic, Normocephalic  EYES: conjunctiva and sclera clear  NECK: Supple, No JVD  CHEST/LUNG: Clear to auscultation bilaterally; No wheeze  HEART: Regular rate and rhythm; No murmurs, rubs, or gallops  ABDOMEN: Soft, Nontender, Nondistended; Bowel sounds present  EXTREMITIES:  No clubbing, cyanosis, or edema  PSYCH: AAOx3  NEUROLOGY: non-focal  SKIN: No rashes or lesions    CAPILLARY BLOOD GLUCOSE        I&O's Summary            LABS:                        7.3    7.74  )-----------( 178      ( 12 Apr 2025 05:10 )             22.1     04-12    141  |  110[H]  |  12  ----------------------------<  104[H]  3.8   |  28  |  0.88    Ca    8.0[L]      12 Apr 2025 05:10  Phos  2.4     04-11  Mg     2.4     04-12    TPro  x   /  Alb  x   /  TBili  0.5  /  DBili  x   /  AST  x   /  ALT  x   /  AlkPhos  x   04-11    PT/INR - ( 11 Apr 2025 05:15 )   PT: 12.8 sec;   INR: 1.10 ratio               Urinalysis Basic - ( 12 Apr 2025 05:10 )    Color: x / Appearance: x / SG: x / pH: x  Gluc: 104 mg/dL / Ketone: x  / Bili: x / Urobili: x   Blood: x / Protein: x / Nitrite: x   Leuk Esterase: x / RBC: x / WBC x   Sq Epi: x / Non Sq Epi: x / Bacteria: x            RADIOLOGY & ADDITIONAL TESTS:

## 2025-04-12 NOTE — PROGRESS NOTE ADULT - ASSESSMENT
69 year old man with hx of HTN with no prior visit to this facility who presents with weakness, dizziness preceded by  few days of dark stools, nausea and dark colored emesis as well but no abdominal pain. Admit for UGIB and pancreatic mass    #Acute symptomatic anemia 2/2 GIB  #Pancreatic mass  #Melena  #HTN  - Patient with continued melena and downtrending hgb requiring 3U pRBC   - EGD neg, Colonoscopy with shallow ulcers    - Trend Hgb - no further bloody BM at this time  - MR with IV contrast to assess pancreas and liver completed      - Plan for outpatient EGD with EUS  - f/u GI recs - recs apreciated  - c/w PPI  - Trend Hgb - stable.

## 2025-04-13 VITALS
TEMPERATURE: 99 F | SYSTOLIC BLOOD PRESSURE: 131 MMHG | RESPIRATION RATE: 18 BRPM | DIASTOLIC BLOOD PRESSURE: 79 MMHG | HEART RATE: 84 BPM | OXYGEN SATURATION: 97 %

## 2025-04-13 LAB
ANION GAP SERPL CALC-SCNC: 6 MMOL/L — SIGNIFICANT CHANGE UP (ref 5–17)
BLD GP AB SCN SERPL QL: SIGNIFICANT CHANGE UP
BUN SERPL-MCNC: 14 MG/DL — SIGNIFICANT CHANGE UP (ref 7–18)
CALCIUM SERPL-MCNC: 8.1 MG/DL — LOW (ref 8.4–10.5)
CHLORIDE SERPL-SCNC: 108 MMOL/L — SIGNIFICANT CHANGE UP (ref 96–108)
CO2 SERPL-SCNC: 27 MMOL/L — SIGNIFICANT CHANGE UP (ref 22–31)
CREAT SERPL-MCNC: 0.93 MG/DL — SIGNIFICANT CHANGE UP (ref 0.5–1.3)
EGFR: 88 ML/MIN/1.73M2 — SIGNIFICANT CHANGE UP
EGFR: 88 ML/MIN/1.73M2 — SIGNIFICANT CHANGE UP
GLUCOSE SERPL-MCNC: 145 MG/DL — HIGH (ref 70–99)
HCT VFR BLD CALC: 23.7 % — LOW (ref 39–50)
HGB BLD-MCNC: 7.7 G/DL — LOW (ref 13–17)
MCHC RBC-ENTMCNC: 28.9 PG — SIGNIFICANT CHANGE UP (ref 27–34)
MCHC RBC-ENTMCNC: 32.5 G/DL — SIGNIFICANT CHANGE UP (ref 32–36)
MCV RBC AUTO: 89.1 FL — SIGNIFICANT CHANGE UP (ref 80–100)
NRBC BLD AUTO-RTO: 0 /100 WBCS — SIGNIFICANT CHANGE UP (ref 0–0)
PLATELET # BLD AUTO: 213 K/UL — SIGNIFICANT CHANGE UP (ref 150–400)
POTASSIUM SERPL-MCNC: 3.8 MMOL/L — SIGNIFICANT CHANGE UP (ref 3.5–5.3)
POTASSIUM SERPL-SCNC: 3.8 MMOL/L — SIGNIFICANT CHANGE UP (ref 3.5–5.3)
RBC # BLD: 2.66 M/UL — LOW (ref 4.2–5.8)
RBC # FLD: 17.3 % — HIGH (ref 10.3–14.5)
SODIUM SERPL-SCNC: 141 MMOL/L — SIGNIFICANT CHANGE UP (ref 135–145)
WBC # BLD: 6.33 K/UL — SIGNIFICANT CHANGE UP (ref 3.8–10.5)
WBC # FLD AUTO: 6.33 K/UL — SIGNIFICANT CHANGE UP (ref 3.8–10.5)

## 2025-04-13 PROCEDURE — 83880 ASSAY OF NATRIURETIC PEPTIDE: CPT

## 2025-04-13 PROCEDURE — 84100 ASSAY OF PHOSPHORUS: CPT

## 2025-04-13 PROCEDURE — 88305 TISSUE EXAM BY PATHOLOGIST: CPT

## 2025-04-13 PROCEDURE — 80076 HEPATIC FUNCTION PANEL: CPT

## 2025-04-13 PROCEDURE — 83036 HEMOGLOBIN GLYCOSYLATED A1C: CPT

## 2025-04-13 PROCEDURE — 71046 X-RAY EXAM CHEST 2 VIEWS: CPT

## 2025-04-13 PROCEDURE — 82941 ASSAY OF GASTRIN: CPT

## 2025-04-13 PROCEDURE — 85610 PROTHROMBIN TIME: CPT

## 2025-04-13 PROCEDURE — 83550 IRON BINDING TEST: CPT

## 2025-04-13 PROCEDURE — 36415 COLL VENOUS BLD VENIPUNCTURE: CPT

## 2025-04-13 PROCEDURE — 86923 COMPATIBILITY TEST ELECTRIC: CPT

## 2025-04-13 PROCEDURE — 83735 ASSAY OF MAGNESIUM: CPT

## 2025-04-13 PROCEDURE — 80053 COMPREHEN METABOLIC PANEL: CPT

## 2025-04-13 PROCEDURE — 74178 CT ABD&PLV WO CNTR FLWD CNTR: CPT | Mod: MC

## 2025-04-13 PROCEDURE — 83690 ASSAY OF LIPASE: CPT

## 2025-04-13 PROCEDURE — 93005 ELECTROCARDIOGRAM TRACING: CPT

## 2025-04-13 PROCEDURE — 86304 IMMUNOASSAY TUMOR CA 125: CPT

## 2025-04-13 PROCEDURE — 85025 COMPLETE CBC W/AUTO DIFF WBC: CPT

## 2025-04-13 PROCEDURE — 99239 HOSP IP/OBS DSCHRG MGMT >30: CPT

## 2025-04-13 PROCEDURE — 82105 ALPHA-FETOPROTEIN SERUM: CPT

## 2025-04-13 PROCEDURE — 84484 ASSAY OF TROPONIN QUANT: CPT

## 2025-04-13 PROCEDURE — 36430 TRANSFUSION BLD/BLD COMPNT: CPT

## 2025-04-13 PROCEDURE — 80048 BASIC METABOLIC PNL TOTAL CA: CPT

## 2025-04-13 PROCEDURE — 74183 MRI ABD W/O CNTR FLWD CNTR: CPT | Mod: MC

## 2025-04-13 PROCEDURE — 99285 EMERGENCY DEPT VISIT HI MDM: CPT

## 2025-04-13 PROCEDURE — 83540 ASSAY OF IRON: CPT

## 2025-04-13 PROCEDURE — 82962 GLUCOSE BLOOD TEST: CPT

## 2025-04-13 PROCEDURE — 86901 BLOOD TYPING SEROLOGIC RH(D): CPT

## 2025-04-13 PROCEDURE — 86301 IMMUNOASSAY TUMOR CA 19-9: CPT

## 2025-04-13 PROCEDURE — 85027 COMPLETE CBC AUTOMATED: CPT

## 2025-04-13 PROCEDURE — 96374 THER/PROPH/DIAG INJ IV PUSH: CPT

## 2025-04-13 PROCEDURE — 96375 TX/PRO/DX INJ NEW DRUG ADDON: CPT

## 2025-04-13 PROCEDURE — 83605 ASSAY OF LACTIC ACID: CPT

## 2025-04-13 PROCEDURE — 86850 RBC ANTIBODY SCREEN: CPT

## 2025-04-13 PROCEDURE — 82378 CARCINOEMBRYONIC ANTIGEN: CPT

## 2025-04-13 PROCEDURE — 81003 URINALYSIS AUTO W/O SCOPE: CPT

## 2025-04-13 PROCEDURE — 86900 BLOOD TYPING SEROLOGIC ABO: CPT

## 2025-04-13 PROCEDURE — A9585: CPT

## 2025-04-13 PROCEDURE — P9040: CPT

## 2025-04-13 PROCEDURE — 82247 BILIRUBIN TOTAL: CPT

## 2025-04-13 PROCEDURE — 85730 THROMBOPLASTIN TIME PARTIAL: CPT

## 2025-04-13 PROCEDURE — 82728 ASSAY OF FERRITIN: CPT

## 2025-04-13 RX ORDER — POLYETHYLENE GLYCOL 3350 17 G/17G
17 POWDER, FOR SOLUTION ORAL
Qty: 1 | Refills: 0
Start: 2025-04-13 | End: 2025-04-26

## 2025-04-13 RX ORDER — SENNA 187 MG
1 TABLET ORAL
Qty: 14 | Refills: 0
Start: 2025-04-13 | End: 2025-04-26

## 2025-04-13 NOTE — DISCHARGE NOTE PROVIDER - CARE PROVIDER_API CALL
Sherice Gruber  Gastroenterology  [address], [address] [place] [address]  Phone: [phone]  Fax: [phone]  Follow Up Time: 2 weeks    Tanna Payton  Gastroenterology  [address]  [place] [address]  Phone: [phone]  Fax: [phone]  Follow Up Time: 2 weeks    Deepak Pantoja  Medical Oncology  [address], [address]  [place] [address]  Phone: [phone]  Fax: [phone]  Follow Up Time: 2 weeks   Sherice Gruber  Gastroenterology  [address] [place] [address]  Phone: [phone]  Fax: [phone]  Follow Up Time: 2 weeks    Tanna Payton  Gastroenterology  [address]  [place] [address]  Phone: [phone]  Fax: [phone]  Follow Up Time: 2 weeks    Adam Dinh  Medical Oncology  [address]  [place] [address]  Phone: [phone]  Fax: [phone]  Follow Up Time: 1 week

## 2025-04-13 NOTE — DISCHARGE NOTE PROVIDER - PROVIDER TOKENS
PROVIDER:[TOKEN:[[id number]],FOLLOWUP:[2 weeks]],PROVIDER:[TOKEN:[[id number]],FOLLOWUP:[2 weeks]],PROVIDER:[TOKEN:[[id number]],FOLLOWUP:[2 weeks]] PROVIDER:[TOKEN:[[id number]],FOLLOWUP:[2 weeks]],PROVIDER:[TOKEN:[[id number]],FOLLOWUP:[2 weeks]],PROVIDER:[TOKEN:[[id number]],FOLLOWUP:[1 week]]

## 2025-04-13 NOTE — DISCHARGE NOTE PROVIDER - NSDCCPCAREPLAN_GEN_ALL_CORE_FT
PRINCIPAL DISCHARGE DIAGNOSIS  Diagnosis: GI bleed  Assessment and Plan of Treatment: You presented to the hospital with complaints of dark stools associated with weakness. YOu were admitted fo a suspected GI bleed. YOu had an endoscopy and colonoscopy.   EGD-Esophagus: non-obstructive Shatzki. 2 cm HH. Stomach: no evidence of bleeding. Normal mucosa throughout. Duodenum: normal mucosa. No bleeding. Plan: No etiology for anemia determined. follow up with GI in clinic to complete anemia eval. colonoscopy- few shallow ulcers in rectum, possibly prep related, 2 cold forceps bx obtained. Moderate internal hemorrhoids, not bleeding, seen on retroflexion. No polyps no tics appreciated. Plan: Await pathology results.  Please  follow - up with [facility] for anemia, and GI for EUS with biopsy of pancreatic lesion.  There are 2 common types of GI Bleed, Upper GI Bleed and Lower GI Bleed.  Upper GI Bleed affects the esophagus, stomach, and first part of the small intestine. Lower GI Bleed affects the colon and rectum.  Upper GI Bleed signs and symptoms to notify your Health Care Provider are vomiting blood, or coffee ground vomitus, and bowel movements that look like black tar.  Lower GI Bleed signs and symptoms to notify your health care provider are bright red bloody bowel movements.   Take your medications as prescribed by your Gastroenterologist.  If you have had an Endoscopy or Colonoscopy, follow up with your Gastroenterologist for Pathology results.  Avoid NSAIDs unless your Health Care Provider tells you that it is ok (Aspirin, Ibuprofen, Advil, Motrin, Aleve).  Follow up with your Gastroenterologist within 1-2 weeks of discharge.        SECONDARY DISCHARGE DIAGNOSES  Diagnosis: Pancreatic tumor  Assessment and Plan of Treatment: You had a ct scan which showed a concern for 1.6 cm pancreatic mass possible neuroendocrine tumor.  You also had an MRI showing peripherally enhancing 1.2 cm lesion in the pancreatic head  Follow - up with Dr. Rehman for an EUS, biopsy of pancreatic lesion.  An EUS is a procedure that uses sound waves to show images of your internal organs. It will give your doctor information about:  How deep a tumor extends into your tissue. If your lymph nodes are enlarged. If there is a tumor in nearby tissues.  During your EUS, your doctor may also do a biopsy procedure to take a sample of tissue with a needle. If you have cysts, your doctor may also use a needle to remove fluid to learn more information about it and understand what is making it grow.    Diagnosis: Anemia due to acute blood loss  Assessment and Plan of Treatment: You were found to have a low blood count on admission. You were treated with blood transfusions and you were followed by a gastroenterologist. Your GI work- up was not revealing as to why you had a low blood count. Anemia is a low number of red blood cells. Some causes of anemia include: A gastrointestinal bleed, Liver or kidney disease, cancer, Alcohol abuse and Lack of foods that contain iron, folic acid, or vitamin B12. Symptoms to report, bleeding, palpitations, fatigue, pale skin, cold skin, dizziness. Take medications as ordered by PCP.  Follow- up with hematology and GI for furhter work - up of your anemia.      Diagnosis: DM (diabetes mellitus)  Assessment and Plan of Treatment: Continue to follow with your primary care MD or your endocrinologist. Discuss what the goal hemoglobin A1C level is for you.  Follow a heart healthy diabetic diet. If you check your fingerstick glucose at home, call your MD if it is greater than 250mg/dL on 2 occasions or less than 100mg/dL on 2 occasions. Know signs of low blood sugar, such as: dizziness, shakiness, sweating, confusion, hunger, nervousness- drink 4 ounces apple juice if occurs and call your doctor. Know early signs of high blood sugar, such as: frequent urination, increased thirst, blurry vision, fatigue, headache - call your doctor if this occurs.      Diagnosis: HLD (hyperlipidemia)  Assessment and Plan of Treatment: You have a history of hyperlipidemia, which is when you have too much cholesterol in your blood. High amounts of cholesterol in your blood can put you at higher risks for heart attck, strokes and other health problems. Follow up with PCP for treatment goals, continue medication as prescribed, have liver function testing every 3 months as anti lipid medications can cause liver irritation, eat low fat meals, avoid red meat, butter, fried foods and cheese. Get daily exercise.

## 2025-04-13 NOTE — DISCHARGE NOTE NURSING/CASE MANAGEMENT/SOCIAL WORK - NSDCPEFALRISK_GEN_ALL_CORE
For information on Fall & Injury Prevention, visit: [email or web address]-[email or web address] OR  [email or web address]-injury OR  https://www.cdc.gov/steadi/patient.html

## 2025-04-13 NOTE — DISCHARGE NOTE PROVIDER - NSDCFUADDAPPT_GEN_ALL_CORE_FT
APPTS ARE READY TO BE MADE: [X] YES    Best Family or Patient Contact (if needed):    Additional Information about above appointments (if needed):    1: Dr. Goodman  2: Dr. Payton  3: Dr. Pantoja    Other comments or requests:    APPTS ARE READY TO BE MADE: [X] YES    Best Family or Patient Contact (if needed):    Additional Information about above appointments (if needed):    1: Dr. Goodman  2: Dr. Payton  3: Dr. Dinh    Other comments or requests:    APPTS ARE READY TO BE MADE: [X] YES    Best Family or Patient Contact (if needed):    Additional Information about above appointments (if needed):    1: Dr. Goodman  2: Dr. Payton  3: Dr. Dinh    Other comments or requests:   Prior to outreaching the patient, it was visible that the patient has secured a follow up appointment which was not scheduled by our team. Patient was previously scheduled for an appointment on 04/17/25 8:30am at [facility] with MINA Da Silva.    Prior to outreaching the patient, it was visible that the patient has secured a follow up appointment which was not scheduled by our team. Patient was previously scheduled for an appointment on 04/24/25 1:00pm [facility] with Dr. Albert Barker.    Prior to outreaching the patient, it was visible that the patient has secured a follow up appointment which was not scheduled by our team. Patient was previously scheduled for an appointment on 05/05/25 10:15am at [address] with Dr. Sherice Mccabe.

## 2025-04-13 NOTE — DISCHARGE NOTE PROVIDER - POSTFACE STATEMENT FOR MINUTES SPENT
minutes on the discharge service. Fluconazole Counseling:  Patient counseled regarding adverse effects of fluconazole including but not limited to headache, diarrhea, nausea, upset stomach, liver function test abnormalities, taste disturbance, and stomach pain.  There is a rare possibility of liver failure that can occur when taking fluconazole.  The patient understands that monitoring of LFTs and kidney function test may be required, especially at baseline. The patient verbalized understanding of the proper use and possible adverse effects of fluconazole.  All of the patient's questions and concerns were addressed.

## 2025-04-13 NOTE — DISCHARGE NOTE PROVIDER - CARE PROVIDERS DIRECT ADDRESSES
[email or web address]

## 2025-04-13 NOTE — DISCHARGE NOTE NURSING/CASE MANAGEMENT/SOCIAL WORK - FINANCIAL ASSISTANCE
[facility] provides services at a reduced cost to those who are determined to be eligible through [facility]’s financial assistance program. Information regarding [facility]’s financial assistance program can be found by going to [email or web address] or by calling [phone].

## 2025-04-13 NOTE — DISCHARGE NOTE NURSING/CASE MANAGEMENT/SOCIAL WORK - NSDCVIVACCINE_GEN_ALL_CORE_FT
Tdap; 02-Apr-2022 21:19; Daniel Hutchison (RN); [employer]; [id number] (Exp. Date: 02-Feb-2024); IntraMuscular; Deltoid Right.; 0.5 milliLiter(s); VIS (VIS Published: 09-May-2013, VIS Presented: 02-Apr-2022);

## 2025-04-13 NOTE — DISCHARGE NOTE PROVIDER - NSDCMRMEDTOKEN_GEN_ALL_CORE_FT
atorvastatin 20 mg oral tablet: 1 tab(s) orally once a day  metFORMIN 500 mg oral tablet: 1 tab(s) orally once a day   atorvastatin 20 mg oral tablet: 1 tab(s) orally once a day  metFORMIN 500 mg oral tablet: 1 tab(s) orally once a day  polyethylene glycol 3350 oral powder for reconstitution: 17 gram(s) orally once a day MDD: 17g  Senna 8.6 mg oral tablet: 1 tab(s) orally once a day (at bedtime) MDD: 2 tab

## 2025-04-13 NOTE — DISCHARGE NOTE NURSING/CASE MANAGEMENT/SOCIAL WORK - PATIENT PORTAL LINK FT
You can access the FollowMyHealth Patient Portal offered by [facility] by registering at the following website: [email or web address]. By joining [facility]’s FollowMyHealth portal, you will also be able to view your health information using other applications (apps) compatible with our system.

## 2025-04-13 NOTE — DISCHARGE NOTE PROVIDER - ATTENDING DISCHARGE PHYSICAL EXAMINATION:
GENERAL: NAD  HEAD:  Atraumatic, Normocephalic  EYES: conjunctiva and sclera clear  NECK: Supple, No JVD  CHEST/LUNG: Clear to auscultation bilaterally; No wheeze  HEART: Regular rate and rhythm; No murmurs, rubs, or gallops  ABDOMEN: Soft, Nontender, Nondistended; Bowel sounds present  EXTREMITIES:  No clubbing, cyanosis, or edema  PSYCH: AAOx3  NEUROLOGY: non-focal  SKIN: No rashes or lesions

## 2025-04-13 NOTE — DISCHARGE NOTE PROVIDER - HOSPITAL COURSE
70 y/o M, from home, Cantonese speaking, Ex smoker ( Quit 10 years ago, 30pk years) with PMH of HLD, DM present with 3 days history of dark stool, generalized weakness, dizziness (room spinning sensation) and nausea. CT A/P - concern for 1.6 cm pancreatic mass possible neuroendocrine tumor, also 8 mm hepatic lesion. Admitted to medicine for further evaluation of UGIB and pancreatic mass.    Patient treated with 3 units of PRBCs with appropriate response.    GI consulted and reccs: EGD-Esophagus: non-obstructive Shatzki. 2 cm HH. Stomach: no evidence of bleeding. Normal mucosa throughout. Duodenum: normal mucosa. No bleeding. Plan: No etiology for anemia determined. follow up with GI in clinic to complete anemia eval.    Hb dropped. colonoscopy- few shallow ulcers in rectum, possibly prep related, 2 cold forceps bx obtained. Moderate internal hemorrhoids, not bleeding, seen on retroflexion. No  polyps no tics appreciated. Plan: Await pathology results.    Hgb remained stable at 7.7 and no further episodes of dark stools.    Case discussed with attending, pt. medically cleared for d/c with outpt follow - up with [facility] for anemia, And GI for EUS w bx of pancreatic lesion.

## 2025-04-13 NOTE — DISCHARGE NOTE NURSING/CASE MANAGEMENT/SOCIAL WORK - NSDCFUADDAPPT_GEN_ALL_CORE_FT
Anesthesia Pre Eval Note    Anesthesia ROS/Med Hx        Anesthetic Complication History:    Patient does not have a history of anesthetic complications      Pulmonary Review:  Patient does not have a pulmonary history      Neuro/Psych Review:       Positive for psychiatric history - Anxiety    Cardiovascular Review:     Positive for hyperlipidemia    GI/HEPATIC/RENAL Review:  Patient does not have a GI/hepatic/renalhistory       End/Other Review:  Patient does not have an endo/other history    Additional Results:      ALLERGIES:   -- Amoxicillin -- RASH   -- Bactrim Ds -- HIVES   Last Labs        Component                Value               Date/Time                  WBC                      8.6                 12/29/2023 1329            RBC                      5.58 (H)            12/29/2023 1329            HGB                      14.8                01/08/2024 1115            HCT                      45.7                01/08/2024 1115            MCV                      86.6                12/29/2023 1329            MCH                      28.7                12/29/2023 1329            MCHC                     33.1                12/29/2023 1329            RDW-CV                   13.3                12/29/2023 1329            Sodium                   139                 12/29/2023 1329            Potassium                4.7                 12/29/2023 1329            Chloride                 105                 12/29/2023 1329            Carbon Dioxide           25                  12/29/2023 1329            Glucose                  92                  12/29/2023 1329            BUN                      10                  12/29/2023 1329            Creatinine               0.84                12/29/2023 1329            Glomerular Filtrati*     >90                 12/29/2023 1329            Calcium                  9.7                 12/29/2023 1329            PLT                      272                  12/29/2023 1329        Past Medical History:  No date: Seasonal allergies      Comment:  Spring symptoms  Past Surgical History:  06/14/2024: Open access colonoscopy  No date: [place] tooth extraction   Prior to Admission medications :  Medication escitalopram (LEXAPRO) 5 MG tablet, Sig Take 1 tablet by mouth daily. Indications: Generalized Anxiety Disorder, Start Date 2/9/24, End Date 2/8/25, Taking? Yes, Authorizing Provider Niecy Bell MD    Medication cetirizine (ZyrTEC) 10 MG tablet, Sig Take 1 tablet by mouth daily., Start Date 6/28/21, End Date , Taking? Yes, Authorizing Provider Sarah Vinson APNP    Medication dicyclomine (BENTYL) 10 MG capsule, Sig Take 1 capsule by mouth 4 times daily as needed (IBS symptoms)., Start Date 2/9/24, End Date , Taking? , Authorizing Provider Niecy Bell MD            Relevant Problems   No relevant active problems       Physical Exam     Airway   Mallampati: II  TM Distance: >3 FB  Neck ROM: Full  TMJ Mobility: Good    Cardiovascular  Cardiovascular exam normal  Cardio Rhythm: Regular  Cardio Rate: Normal    Dental Exam  Dental exam normal    Pulmonary Exam  Pulmonary exam normal      Anesthesia Plan:    ASA Status: 2  Anesthesia Type: MAC      Post-op Pain Management: Per Surgeon      Checklist  Reviewed: Lab Results, EKG, Chest X-Rays, Pregnancy Test Results, Consultations, Outside Records, NPO Status, Medications, Problem list, Past Med History and Nursing Notes  Consent/Risks Discussed Statement:  The proposed anesthetic plan, including its risks and benefits, have been discussed with the Patient along with the risks and benefits of alternatives. Questions were encouraged and answered and the patient and/or representative understands and agrees to proceed.        I discussed with the patient (and/or patient's legal representative) the risks and benefits of the proposed anesthesia plan, MAC, which may include services performed by other anesthesia  providers.    Alternative anesthesia plans, if available, were reviewed with the patient (and/or patient's legal representative). Discussion has been held with the patient (and/or patient's legal representative) regarding risks of anesthesia, which include Nausea, Vomiting, Intra-operative Awareness, Sore Throat, Allergic Reaction, Dental Injury, Emergence Delirium, Hypotension, Nerve Injury and Aspiration and emergent situations that may require change in anesthesia plan.    The patient (and/or patient's legal representative) has indicated understanding, his/her questions have been answered, and he/she wishes to proceed with the planned anesthetic.    Blood Products: Not Anticipated    Comments  Plan Comments: Discussed team model with patient. Discussed risks/Benefits of MAC, pt agrees and wishes to proceed.     APPTS ARE READY TO BE MADE: [X] YES    Best Family or Patient Contact (if needed):    Additional Information about above appointments (if needed):    1: Dr. Goodman  2: Dr. Payton  3: Dr. Dinh    Other comments or requests:

## 2025-04-14 PROBLEM — Z00.00 ENCOUNTER FOR PREVENTIVE HEALTH EXAMINATION: Status: ACTIVE | Noted: 2025-04-14

## 2025-04-14 PROBLEM — I10 ESSENTIAL (PRIMARY) HYPERTENSION: Chronic | Status: ACTIVE | Noted: 2025-04-08

## 2025-04-14 PROBLEM — E11.9 TYPE 2 DIABETES MELLITUS WITHOUT COMPLICATIONS: Chronic | Status: ACTIVE | Noted: 2025-04-08

## 2025-04-15 LAB — SURGICAL PATHOLOGY STUDY: SIGNIFICANT CHANGE UP

## 2025-04-17 ENCOUNTER — APPOINTMENT (OUTPATIENT)
Age: 70
End: 2025-04-17

## 2025-04-17 VITALS
HEIGHT: 66 IN | OXYGEN SATURATION: 100 % | TEMPERATURE: 98.1 F | WEIGHT: 198 LBS | HEART RATE: 108 BPM | DIASTOLIC BLOOD PRESSURE: 79 MMHG | SYSTOLIC BLOOD PRESSURE: 129 MMHG | BODY MASS INDEX: 31.82 KG/M2

## 2025-04-17 DIAGNOSIS — Z87.891 PERSONAL HISTORY OF NICOTINE DEPENDENCE: ICD-10-CM

## 2025-04-17 DIAGNOSIS — Z82.49 FAMILY HISTORY OF ISCHEMIC HEART DISEASE AND OTHER DISEASES OF THE CIRCULATORY SYSTEM: ICD-10-CM

## 2025-04-17 DIAGNOSIS — Z86.39 PERSONAL HISTORY OF OTHER ENDOCRINE, NUTRITIONAL AND METABOLIC DISEASE: ICD-10-CM

## 2025-04-17 DIAGNOSIS — Z78.9 OTHER SPECIFIED HEALTH STATUS: ICD-10-CM

## 2025-04-17 DIAGNOSIS — Z87.19 PERSONAL HISTORY OF OTHER DISEASES OF THE DIGESTIVE SYSTEM: ICD-10-CM

## 2025-04-17 PROCEDURE — 99203 OFFICE O/P NEW LOW 30 MIN: CPT

## 2025-04-17 RX ORDER — FAMOTIDINE 40 MG/1
40 TABLET, FILM COATED ORAL
Refills: 0 | Status: ACTIVE | COMMUNITY

## 2025-04-17 RX ORDER — ATORVASTATIN CALCIUM 20 MG/1
20 TABLET, FILM COATED ORAL
Refills: 0 | Status: ACTIVE | COMMUNITY

## 2025-04-24 ENCOUNTER — APPOINTMENT (OUTPATIENT)
Dept: SURGICAL ONCOLOGY | Facility: CLINIC | Age: 70
End: 2025-04-24
Payer: MEDICARE

## 2025-04-24 VITALS
WEIGHT: 200 LBS | DIASTOLIC BLOOD PRESSURE: 76 MMHG | BODY MASS INDEX: 32.28 KG/M2 | RESPIRATION RATE: 16 BRPM | OXYGEN SATURATION: 97 % | SYSTOLIC BLOOD PRESSURE: 122 MMHG | HEART RATE: 81 BPM

## 2025-04-24 DIAGNOSIS — K86.89 OTHER SPECIFIED DISEASES OF PANCREAS: ICD-10-CM

## 2025-04-24 PROCEDURE — 99205 OFFICE O/P NEW HI 60 MIN: CPT

## 2025-04-25 PROBLEM — K86.89 MASS OF PANCREAS: Status: ACTIVE | Noted: 2025-04-25

## 2025-05-05 ENCOUNTER — TRANSCRIPTION ENCOUNTER (OUTPATIENT)
Age: 70
End: 2025-05-05

## 2025-05-05 ENCOUNTER — APPOINTMENT (OUTPATIENT)
Dept: GASTROENTEROLOGY | Facility: HOSPITAL | Age: 70
End: 2025-05-05

## 2025-05-05 ENCOUNTER — OUTPATIENT (OUTPATIENT)
Dept: OUTPATIENT SERVICES | Facility: HOSPITAL | Age: 70
LOS: 1 days | End: 2025-05-05
Payer: MEDICARE

## 2025-05-05 VITALS
DIASTOLIC BLOOD PRESSURE: 80 MMHG | HEIGHT: 69 IN | WEIGHT: 203.93 LBS | SYSTOLIC BLOOD PRESSURE: 127 MMHG | HEART RATE: 78 BPM | RESPIRATION RATE: 17 BRPM | TEMPERATURE: 98 F | OXYGEN SATURATION: 100 %

## 2025-05-05 VITALS
DIASTOLIC BLOOD PRESSURE: 72 MMHG | OXYGEN SATURATION: 99 % | RESPIRATION RATE: 18 BRPM | SYSTOLIC BLOOD PRESSURE: 112 MMHG | HEART RATE: 83 BPM

## 2025-05-05 DIAGNOSIS — Z87.19 PERSONAL HISTORY OF OTHER DISEASES OF THE DIGESTIVE SYSTEM: ICD-10-CM

## 2025-05-05 PROCEDURE — 43238 EGD US FINE NEEDLE BX/ASPIR: CPT

## 2025-05-05 NOTE — ASU DISCHARGE PLAN (ADULT/PEDIATRIC) - FINANCIAL ASSISTANCE
[facility] provides services at a reduced cost to those who are determined to be eligible through [facility]’s financial assistance program. Information regarding [facility]’s financial assistance program can be found by going to [email or web address] or by calling [phone].

## 2025-05-05 NOTE — ASU DISCHARGE PLAN (ADULT/PEDIATRIC) - NS MD DC FALL RISK RISK
For information on Fall & Injury Prevention, visit: [email or web address]/[email or web address]-[email or web address]-mobility OR  [email or web address]://www.[facility]/[email or web address]-tips-to-avoid-injury OR  https://www.cdc.gov/steadi/patient.html

## 2025-05-06 ENCOUNTER — RESULT REVIEW (OUTPATIENT)
Age: 70
End: 2025-05-06

## 2025-05-06 PROCEDURE — 88307 TISSUE EXAM BY PATHOLOGIST: CPT

## 2025-05-06 PROCEDURE — 43242 EGD US FINE NEEDLE BX/ASPIR: CPT

## 2025-05-06 PROCEDURE — 88307 TISSUE EXAM BY PATHOLOGIST: CPT | Mod: 26

## 2025-05-07 LAB — SURGICAL PATHOLOGY STUDY: SIGNIFICANT CHANGE UP

## 2025-05-16 ENCOUNTER — OUTPATIENT (OUTPATIENT)
Dept: OUTPATIENT SERVICES | Facility: HOSPITAL | Age: 70
LOS: 1 days | End: 2025-05-16
Payer: MEDICARE

## 2025-05-16 ENCOUNTER — APPOINTMENT (OUTPATIENT)
Dept: GASTROENTEROLOGY | Facility: HOSPITAL | Age: 70
End: 2025-05-16

## 2025-05-16 ENCOUNTER — TRANSCRIPTION ENCOUNTER (OUTPATIENT)
Age: 70
End: 2025-05-16

## 2025-05-16 VITALS
HEIGHT: 66 IN | OXYGEN SATURATION: 98 % | RESPIRATION RATE: 12 BRPM | TEMPERATURE: 98 F | DIASTOLIC BLOOD PRESSURE: 85 MMHG | SYSTOLIC BLOOD PRESSURE: 130 MMHG | HEART RATE: 80 BPM | WEIGHT: 199.08 LBS

## 2025-05-16 VITALS
HEART RATE: 80 BPM | SYSTOLIC BLOOD PRESSURE: 117 MMHG | OXYGEN SATURATION: 97 % | DIASTOLIC BLOOD PRESSURE: 82 MMHG | RESPIRATION RATE: 17 BRPM

## 2025-05-16 DIAGNOSIS — Z87.19 PERSONAL HISTORY OF OTHER DISEASES OF THE DIGESTIVE SYSTEM: ICD-10-CM

## 2025-05-16 DIAGNOSIS — Z98.890 OTHER SPECIFIED POSTPROCEDURAL STATES: Chronic | ICD-10-CM

## 2025-05-16 PROCEDURE — 43239 EGD BIOPSY SINGLE/MULTIPLE: CPT | Mod: 59

## 2025-05-16 PROCEDURE — 43238 EGD US FINE NEEDLE BX/ASPIR: CPT

## 2025-05-16 PROCEDURE — 88307 TISSUE EXAM BY PATHOLOGIST: CPT

## 2025-05-16 PROCEDURE — 43242 EGD US FINE NEEDLE BX/ASPIR: CPT

## 2025-05-16 PROCEDURE — 88307 TISSUE EXAM BY PATHOLOGIST: CPT | Mod: 26

## 2025-05-16 NOTE — ASU DISCHARGE PLAN (ADULT/PEDIATRIC) - FINANCIAL ASSISTANCE
[facility] provides services at a reduced cost to those who are determined to be eligible through [facility]’s financial assistance program. Information regarding [facility]’s financial assistance program can be found by going to [email or web address] or by calling [phone].

## 2025-05-16 NOTE — ASU PATIENT PROFILE, ADULT - NSICDXPASTMEDICALHX_GEN_ALL_CORE_FT
PAST MEDICAL HISTORY:  Diabetes     HTN (hypertension)     Hypercholesterolemia      PAST MEDICAL HISTORY:  HTN (hypertension)     Hypercholesterolemia     Pre-existing type 2 diabetes mellitus

## 2025-05-17 PROBLEM — E78.00 PURE HYPERCHOLESTEROLEMIA, UNSPECIFIED: Chronic | Status: ACTIVE | Noted: 2025-05-16

## 2025-05-17 PROBLEM — E11.9 TYPE 2 DIABETES MELLITUS WITHOUT COMPLICATIONS: Chronic | Status: ACTIVE | Noted: 2025-05-16

## 2025-05-23 ENCOUNTER — APPOINTMENT (OUTPATIENT)
Dept: NUCLEAR MEDICINE | Facility: CLINIC | Age: 70
End: 2025-05-23

## 2025-05-23 PROCEDURE — 78815 PET IMAGE W/CT SKULL-THIGH: CPT | Mod: PI

## 2025-05-23 PROCEDURE — A9592: CPT | Mod: JZ

## 2025-05-27 LAB — SURGICAL PATHOLOGY STUDY: SIGNIFICANT CHANGE UP

## 2025-07-29 ENCOUNTER — NON-APPOINTMENT (OUTPATIENT)
Age: 70
End: 2025-07-29

## 2025-07-30 ENCOUNTER — APPOINTMENT (OUTPATIENT)
Dept: SURGICAL ONCOLOGY | Facility: CLINIC | Age: 70
End: 2025-07-30
Payer: MEDICARE

## 2025-07-30 VITALS
OXYGEN SATURATION: 96 % | BODY MASS INDEX: 32.3 KG/M2 | HEART RATE: 82 BPM | WEIGHT: 201 LBS | HEIGHT: 66 IN | SYSTOLIC BLOOD PRESSURE: 135 MMHG | DIASTOLIC BLOOD PRESSURE: 84 MMHG

## 2025-07-30 DIAGNOSIS — K86.89 OTHER SPECIFIED DISEASES OF PANCREAS: ICD-10-CM

## 2025-07-30 PROCEDURE — 99213 OFFICE O/P EST LOW 20 MIN: CPT

## 2025-08-11 ENCOUNTER — APPOINTMENT (OUTPATIENT)
Dept: MRI IMAGING | Facility: CLINIC | Age: 70
End: 2025-08-11
Payer: MEDICARE

## 2025-08-11 PROCEDURE — A9585: CPT | Mod: JW

## 2025-08-11 PROCEDURE — 74183 MRI ABD W/O CNTR FLWD CNTR: CPT

## 2025-08-20 ENCOUNTER — APPOINTMENT (OUTPATIENT)
Dept: SURGICAL ONCOLOGY | Facility: CLINIC | Age: 70
End: 2025-08-20

## 2025-08-20 PROCEDURE — 98971 NQHP OL DIG ASSMT&MGMT 11-20: CPT

## 2025-08-20 PROCEDURE — ZZZZZ: CPT

## 2025-08-29 ENCOUNTER — APPOINTMENT (OUTPATIENT)
Dept: GASTROENTEROLOGY | Facility: CLINIC | Age: 70
End: 2025-08-29
Payer: MEDICARE

## 2025-08-29 ENCOUNTER — NON-APPOINTMENT (OUTPATIENT)
Age: 70
End: 2025-08-29

## 2025-08-29 DIAGNOSIS — K86.89 OTHER SPECIFIED DISEASES OF PANCREAS: ICD-10-CM

## 2025-08-29 PROCEDURE — 99215 OFFICE O/P EST HI 40 MIN: CPT | Mod: 2W

## 2025-09-18 ENCOUNTER — APPOINTMENT (OUTPATIENT)
Dept: GASTROENTEROLOGY | Facility: CLINIC | Age: 70
End: 2025-09-18

## (undated) DEVICE — SOLIDIFIER ISOLYZER 2000CC

## (undated) DEVICE — VENODYNE/SCD SLEEVE CALF MEDIUM

## (undated) DEVICE — SOL INJ NS 0.9% 500ML 2 PORT

## (undated) DEVICE — DRSG CURITY GAUZE SPONGE 4 X 4" 12-PLY NON-STERILE

## (undated) DEVICE — TUBING SUCTION NONCONDUCTIVE 6MM X 12FT

## (undated) DEVICE — Device

## (undated) DEVICE — BITE BLOCK ADULT 20 X 27MM (GREEN)

## (undated) DEVICE — GOWN LG

## (undated) DEVICE — SOL INJ NS 0.9% 500ML 1-PORT

## (undated) DEVICE — CATH BLLN ULTRASONIC ENSOSCOPE

## (undated) DEVICE — CATH IV SAFE BC 22G X 1" (BLUE)

## (undated) DEVICE — PACK IV START WITH CHG

## (undated) DEVICE — TUBING SUCTION CONN 6FT STERILE

## (undated) DEVICE — BASIN EMESIS 10IN GRADUATED MAUVE

## (undated) DEVICE — SUCTION YANKAUER NO CONTROL VENT

## (undated) DEVICE — FORCEP RADIAL JAW 4 JUMBO 2.8MM 3.2MM 240CM ORANGE DISP

## (undated) DEVICE — TUBING IV SET GRAVITY 3Y 100" MACRO

## (undated) DEVICE — ELCTR ECG CONDUCTIVE ADHESIVE

## (undated) DEVICE — TUBING CANNULA SALTER LABS NASAL ADULT 7FT

## (undated) DEVICE — CATH IV SAFE BC 20G X 1.16" (PINK)

## (undated) DEVICE — DRSG 2X2

## (undated) DEVICE — FORMALIN CONTAINER MED

## (undated) DEVICE — BIOPSY FORCEP RADIAL JAW 4 STANDARD WITH NEEDLE

## (undated) DEVICE — KIT ENDO PROCEDURE CUST W/VLV

## (undated) DEVICE — FORMALIN CUPS 10% BUFFERED

## (undated) DEVICE — SYS BIOPSY NDL FILE SS STRL 22G

## (undated) DEVICE — SENSOR O2 FINGER ADULT

## (undated) DEVICE — VALVE ENDO SURESEAL II 0-5FR

## (undated) DEVICE — SALIVA EJECTOR (BLUE)

## (undated) DEVICE — TUBING ENDO EXT OLYMPUS 160 24HR USE GI

## (undated) DEVICE — BALLOON US ENDO

## (undated) DEVICE — KIT ENDO PROCEDURE CUST 10/BX

## (undated) DEVICE — SYR LUER LOK 10CC

## (undated) DEVICE — CONTAINER FORMALIN 10% 20ML

## (undated) DEVICE — SYR ALLIANCE II INFLATION 60ML

## (undated) DEVICE — MASK O2 ADLT POM ELITE W/ MED AND HI CONCEN M TO M 10FT

## (undated) DEVICE — BIOPSY FORCEP COLD DISP

## (undated) DEVICE — DRSG BANDAID 0.75X3"

## (undated) DEVICE — TUBING MEDI-VAC W MAXIGRIP CONNECTORS 1/4"X6'

## (undated) DEVICE — TUBING SUCTION 20FT

## (undated) DEVICE — IRRIGATOR BIO SHIELD

## (undated) DEVICE — NDL ACQUIRE EUS FNB 22GA STRL

## (undated) DEVICE — SYR IV FLUSH SALINE 10ML 30/TY

## (undated) DEVICE — TUBE VENOUS BLOOD COLLECTION LIGHT GREEN TOP

## (undated) DEVICE — FOLEY HOLDER STATLOCK 2 WAY ADULT

## (undated) DEVICE — ELCTR GROUNDING PAD ADULT COVIDIEN

## (undated) DEVICE — LUBRICATING JELLY HR ONE SHOT 3G